# Patient Record
Sex: FEMALE | Race: WHITE | URBAN - METROPOLITAN AREA
[De-identification: names, ages, dates, MRNs, and addresses within clinical notes are randomized per-mention and may not be internally consistent; named-entity substitution may affect disease eponyms.]

---

## 2023-01-01 ENCOUNTER — APPOINTMENT (OUTPATIENT)
Dept: GENERAL RADIOLOGY | Age: 73
DRG: 871 | End: 2023-01-01
Payer: MEDICARE

## 2023-01-01 ENCOUNTER — APPOINTMENT (OUTPATIENT)
Dept: CT IMAGING | Age: 73
DRG: 871 | End: 2023-01-01
Payer: MEDICARE

## 2023-01-01 ENCOUNTER — HOSPITAL ENCOUNTER (INPATIENT)
Age: 73
LOS: 1 days | Discharge: HOSPICE | DRG: 871 | End: 2023-02-20
Attending: EMERGENCY MEDICINE | Admitting: INTERNAL MEDICINE
Payer: MEDICARE

## 2023-01-01 ENCOUNTER — APPOINTMENT (OUTPATIENT)
Dept: ULTRASOUND IMAGING | Age: 73
DRG: 871 | End: 2023-01-01
Payer: MEDICARE

## 2023-01-01 VITALS
HEART RATE: 90 BPM | OXYGEN SATURATION: 100 % | TEMPERATURE: 98.4 F | RESPIRATION RATE: 19 BRPM | BODY MASS INDEX: 35.89 KG/M2 | WEIGHT: 215.39 LBS | SYSTOLIC BLOOD PRESSURE: 119 MMHG | DIASTOLIC BLOOD PRESSURE: 55 MMHG | HEIGHT: 65 IN

## 2023-01-01 DIAGNOSIS — R77.8 ELEVATED TROPONIN: ICD-10-CM

## 2023-01-01 DIAGNOSIS — R74.8 ELEVATED LIVER ENZYMES: ICD-10-CM

## 2023-01-01 DIAGNOSIS — S22.49XA CLOSED FRACTURE OF MULTIPLE RIBS, UNSPECIFIED LATERALITY, INITIAL ENCOUNTER: ICD-10-CM

## 2023-01-01 DIAGNOSIS — R91.8 LUNG MASS: ICD-10-CM

## 2023-01-01 DIAGNOSIS — D64.9 ANEMIA, UNSPECIFIED TYPE: ICD-10-CM

## 2023-01-01 DIAGNOSIS — J90 LOCULATED PLEURAL EFFUSION: ICD-10-CM

## 2023-01-01 DIAGNOSIS — I46.9 CARDIOPULMONARY ARREST (HCC): Primary | ICD-10-CM

## 2023-01-01 DIAGNOSIS — Z93.0 TRACHEOSTOMY DEPENDENCE (HCC): ICD-10-CM

## 2023-01-01 DIAGNOSIS — R79.89 ELEVATED BRAIN NATRIURETIC PEPTIDE (BNP) LEVEL: ICD-10-CM

## 2023-01-01 LAB
ADENOVIRUS BY PCR: NOT DETECTED
ALBUMIN SERPL-MCNC: 2.5 G/DL (ref 3.5–4.6)
ALBUMIN SERPL-MCNC: 2.6 G/DL (ref 3.5–4.6)
ALP BLD-CCNC: 202 U/L (ref 40–130)
ALP BLD-CCNC: 243 U/L (ref 40–130)
ALT SERPL-CCNC: 40 U/L (ref 0–33)
ALT SERPL-CCNC: 64 U/L (ref 0–33)
ANION GAP SERPL CALCULATED.3IONS-SCNC: 10 MEQ/L (ref 9–15)
ANION GAP SERPL CALCULATED.3IONS-SCNC: 10 MEQ/L (ref 9–15)
ANION GAP SERPL CALCULATED.3IONS-SCNC: 15 MEQ/L (ref 9–15)
ANION GAP SERPL CALCULATED.3IONS-SCNC: 9 MEQ/L (ref 9–15)
ANION GAP SERPL CALCULATED.3IONS-SCNC: 9 MEQ/L (ref 9–15)
ANISOCYTOSIS: ABNORMAL
APTT: 38.1 SEC (ref 24.4–36.8)
APTT: 47.1 SEC (ref 24.4–36.8)
AST SERPL-CCNC: 27 U/L (ref 0–35)
AST SERPL-CCNC: 95 U/L (ref 0–35)
BACTERIA: ABNORMAL /HPF
BANDED NEUTROPHILS RELATIVE PERCENT: 9 % (ref 5–11)
BASE EXCESS ARTERIAL: 11 (ref -3–3)
BASE EXCESS ARTERIAL: 7 (ref -3–3)
BASOPHILS ABSOLUTE: 0 K/UL (ref 0–0.2)
BASOPHILS ABSOLUTE: 0.1 K/UL (ref 0–0.2)
BASOPHILS RELATIVE PERCENT: 0.4 %
BASOPHILS RELATIVE PERCENT: 1 %
BILIRUB SERPL-MCNC: 0.3 MG/DL (ref 0.2–0.7)
BILIRUB SERPL-MCNC: <0.2 MG/DL (ref 0.2–0.7)
BILIRUBIN DIRECT: <0.2 MG/DL (ref 0–0.4)
BILIRUBIN URINE: NEGATIVE
BILIRUBIN, INDIRECT: ABNORMAL MG/DL (ref 0–0.6)
BLOOD, URINE: ABNORMAL
BORDETELLA PARAPERTUSSIS BY PCR: NOT DETECTED
BORDETELLA PERTUSSIS BY PCR: NOT DETECTED
BUN BLDV-MCNC: 26 MG/DL (ref 8–23)
BUN BLDV-MCNC: 27 MG/DL (ref 8–23)
BUN BLDV-MCNC: 29 MG/DL (ref 8–23)
BUN BLDV-MCNC: 30 MG/DL (ref 8–23)
BUN BLDV-MCNC: 32 MG/DL (ref 8–23)
CALCIUM IONIZED: 1.21 MMOL/L (ref 1.12–1.32)
CALCIUM IONIZED: 1.28 MMOL/L (ref 1.12–1.32)
CALCIUM SERPL-MCNC: 8.6 MG/DL (ref 8.5–9.9)
CALCIUM SERPL-MCNC: 8.7 MG/DL (ref 8.5–9.9)
CALCIUM SERPL-MCNC: 8.8 MG/DL (ref 8.5–9.9)
CALCIUM SERPL-MCNC: 8.9 MG/DL (ref 8.5–9.9)
CALCIUM SERPL-MCNC: 8.9 MG/DL (ref 8.5–9.9)
CHLAMYDOPHILIA PNEUMONIAE BY PCR: NOT DETECTED
CHLORIDE BLD-SCNC: 100 MEQ/L (ref 95–107)
CHLORIDE BLD-SCNC: 101 MEQ/L (ref 95–107)
CHLORIDE BLD-SCNC: 102 MEQ/L (ref 95–107)
CHLORIDE BLD-SCNC: 102 MEQ/L (ref 95–107)
CHLORIDE BLD-SCNC: 98 MEQ/L (ref 95–107)
CHP ED QC CHECK: NORMAL
CLARITY: CLEAR
CO2: 29 MEQ/L (ref 20–31)
CO2: 30 MEQ/L (ref 20–31)
CO2: 32 MEQ/L (ref 20–31)
CO2: 32 MEQ/L (ref 20–31)
CO2: 33 MEQ/L (ref 20–31)
COLOR: YELLOW
CORONAVIRUS 229E BY PCR: NOT DETECTED
CORONAVIRUS HKU1 BY PCR: NOT DETECTED
CORONAVIRUS NL63 BY PCR: NOT DETECTED
CORONAVIRUS OC43 BY PCR: NOT DETECTED
CREAT SERPL-MCNC: 0.54 MG/DL (ref 0.5–0.9)
CREAT SERPL-MCNC: 0.55 MG/DL (ref 0.5–0.9)
CREAT SERPL-MCNC: 0.59 MG/DL (ref 0.5–0.9)
CREAT SERPL-MCNC: 0.65 MG/DL (ref 0.5–0.9)
CREAT SERPL-MCNC: 0.81 MG/DL (ref 0.5–0.9)
EKG ATRIAL RATE: 103 BPM
EKG P AXIS: 63 DEGREES
EKG P-R INTERVAL: 184 MS
EKG Q-T INTERVAL: 364 MS
EKG QRS DURATION: 102 MS
EKG QTC CALCULATION (BAZETT): 476 MS
EKG R AXIS: -57 DEGREES
EKG T AXIS: 70 DEGREES
EKG VENTRICULAR RATE: 103 BPM
EOSINOPHILS ABSOLUTE: 0.6 K/UL (ref 0–0.7)
EOSINOPHILS ABSOLUTE: 1.8 K/UL (ref 0–0.7)
EOSINOPHILS RELATIVE PERCENT: 2.8 %
EOSINOPHILS RELATIVE PERCENT: 5 %
EPITHELIAL CELLS, UA: ABNORMAL /HPF (ref 0–5)
GFR SERPL CREATININE-BSD FRML MDRD: >60 ML/MIN/{1.73_M2}
GLOBULIN: 2.8 G/DL (ref 2.3–3.5)
GLUCOSE BLD-MCNC: 123 MG/DL (ref 70–99)
GLUCOSE BLD-MCNC: 126 MG/DL (ref 70–99)
GLUCOSE BLD-MCNC: 132 MG/DL (ref 70–99)
GLUCOSE BLD-MCNC: 139 MG/DL (ref 70–99)
GLUCOSE BLD-MCNC: 142 MG/DL (ref 70–99)
GLUCOSE BLD-MCNC: 143 MG/DL (ref 70–99)
GLUCOSE BLD-MCNC: 145 MG/DL (ref 70–99)
GLUCOSE BLD-MCNC: 150 MG/DL (ref 70–99)
GLUCOSE BLD-MCNC: 173 MG/DL (ref 70–99)
GLUCOSE BLD-MCNC: 212 MG/DL (ref 70–99)
GLUCOSE BLD-MCNC: 214 MG/DL (ref 70–99)
GLUCOSE BLD-MCNC: 243 MG/DL (ref 70–99)
GLUCOSE BLD-MCNC: 261 MG/DL
GLUCOSE URINE: NEGATIVE MG/DL
HBA1C MFR BLD: 6.2 % (ref 4.8–5.9)
HCO3 ARTERIAL: 32.3 MMOL/L (ref 21–29)
HCO3 ARTERIAL: 36.3 MMOL/L (ref 21–29)
HCT VFR BLD CALC: 25 % (ref 37–47)
HCT VFR BLD CALC: 27.4 % (ref 37–47)
HEMOGLOBIN: 12.2 GM/DL (ref 12–16)
HEMOGLOBIN: 7.2 GM/DL (ref 12–16)
HEMOGLOBIN: 7.6 G/DL (ref 12–16)
HEMOGLOBIN: 7.9 G/DL (ref 12–16)
HUMAN METAPNEUMOVIRUS BY PCR: NOT DETECTED
HUMAN RHINOVIRUS/ENTEROVIRUS BY PCR: NOT DETECTED
HYALINE CASTS: ABNORMAL /HPF (ref 0–5)
INFLUENZA A BY PCR: NOT DETECTED
INFLUENZA B BY PCR: NOT DETECTED
INR BLD: 1.2
INR BLD: 1.3
IRON SATURATION: 25 % (ref 20–55)
IRON: 52 UG/DL (ref 37–145)
KETONES, URINE: NEGATIVE MG/DL
LACTATE: 0.47 MMOL/L (ref 0.4–2)
LACTATE: 4.34 MMOL/L (ref 0.4–2)
LACTIC ACID: 0.8 MMOL/L (ref 0.5–2.2)
LEUKOCYTE ESTERASE, URINE: ABNORMAL
LV EF: 55 %
LVEF MODALITY: NORMAL
LYMPHOCYTES ABSOLUTE: 1.3 K/UL (ref 1–4.8)
LYMPHOCYTES ABSOLUTE: 1.5 K/UL (ref 1–4.8)
LYMPHOCYTES RELATIVE PERCENT: 4 %
LYMPHOCYTES RELATIVE PERCENT: 5.8 %
MAGNESIUM: 1.6 MG/DL (ref 1.7–2.4)
MAGNESIUM: 1.6 MG/DL (ref 1.7–2.4)
MAGNESIUM: 1.7 MG/DL (ref 1.7–2.4)
MAGNESIUM: 1.8 MG/DL (ref 1.7–2.4)
MAGNESIUM: 1.8 MG/DL (ref 1.7–2.4)
MCH RBC QN AUTO: 28 PG (ref 27–31.3)
MCH RBC QN AUTO: 29.1 PG (ref 27–31.3)
MCHC RBC AUTO-ENTMCNC: 28.8 % (ref 33–37)
MCHC RBC AUTO-ENTMCNC: 30.5 % (ref 33–37)
MCV RBC AUTO: 95.2 FL (ref 79.4–94.8)
MCV RBC AUTO: 97 FL (ref 79.4–94.8)
METAMYELOCYTES RELATIVE PERCENT: 5 %
MONOCYTES ABSOLUTE: 1.5 K/UL (ref 0.2–0.8)
MONOCYTES ABSOLUTE: 2.5 K/UL (ref 0.2–0.8)
MONOCYTES RELATIVE PERCENT: 6.7 %
MONOCYTES RELATIVE PERCENT: 7 %
MUCUS: PRESENT /LPF
MYCOPLASMA PNEUMONIAE BY PCR: NOT DETECTED
NEUTROPHILS ABSOLUTE: 18.2 K/UL (ref 1.4–6.5)
NEUTROPHILS ABSOLUTE: 30.9 K/UL (ref 1.4–6.5)
NEUTROPHILS RELATIVE PERCENT: 71 %
NEUTROPHILS RELATIVE PERCENT: 84 %
NITRITE, URINE: NEGATIVE
O2 SAT, ARTERIAL: 96 % (ref 93–100)
O2 SAT, ARTERIAL: 96 % (ref 93–100)
PARAINFLUENZA VIRUS 1 BY PCR: NOT DETECTED
PARAINFLUENZA VIRUS 2 BY PCR: NOT DETECTED
PARAINFLUENZA VIRUS 3 BY PCR: NOT DETECTED
PARAINFLUENZA VIRUS 4 BY PCR: NOT DETECTED
PCO2 ARTERIAL: 53 MM HG (ref 35–45)
PCO2 ARTERIAL: 59 MM HG (ref 35–45)
PDW BLD-RTO: 18.6 % (ref 11.5–14.5)
PDW BLD-RTO: 19.4 % (ref 11.5–14.5)
PERFORMED ON: ABNORMAL
PH ARTERIAL: 7.39 (ref 7.35–7.45)
PH ARTERIAL: 7.4 (ref 7.35–7.45)
PH UA: 6.5 (ref 5–9)
PHOSPHORUS: 3.1 MG/DL (ref 2.3–4.8)
PHOSPHORUS: 3.5 MG/DL (ref 2.3–4.8)
PHOSPHORUS: 4 MG/DL (ref 2.3–4.8)
PHOSPHORUS: 4.5 MG/DL (ref 2.3–4.8)
PLATELET # BLD: 314 K/UL (ref 130–400)
PLATELET # BLD: 371 K/UL (ref 130–400)
PLATELET SLIDE REVIEW: ABNORMAL
PO2 ARTERIAL: 84 MM HG (ref 75–108)
PO2 ARTERIAL: 86 MM HG (ref 75–108)
POC CHLORIDE: 100 MEQ/L (ref 99–110)
POC CHLORIDE: 101 MEQ/L (ref 99–110)
POC CREATININE: 0.5 MG/DL (ref 0.6–1.2)
POC CREATININE: 0.9 MG/DL (ref 0.6–1.2)
POC FIO2: 100
POC FIO2: 50
POC HEMATOCRIT: 21 % (ref 36–48)
POC HEMATOCRIT: 36 % (ref 36–48)
POC POTASSIUM: 4.1 MEQ/L (ref 3.5–5.1)
POC POTASSIUM: 4.5 MEQ/L (ref 3.5–5.1)
POC SAMPLE TYPE: ABNORMAL
POC SAMPLE TYPE: ABNORMAL
POC SODIUM: 139 MEQ/L (ref 136–145)
POC SODIUM: 142 MEQ/L (ref 136–145)
POIKILOCYTES: ABNORMAL
POLYCHROMASIA: ABNORMAL
POTASSIUM SERPL-SCNC: 4.2 MEQ/L (ref 3.4–4.9)
POTASSIUM SERPL-SCNC: 4.2 MEQ/L (ref 3.4–4.9)
POTASSIUM SERPL-SCNC: 4.3 MEQ/L (ref 3.4–4.9)
POTASSIUM SERPL-SCNC: 4.8 MEQ/L (ref 3.4–4.9)
POTASSIUM SERPL-SCNC: 5.1 MEQ/L (ref 3.4–4.9)
PRO-BNP: 7629 PG/ML
PROTEIN UA: >=300 MG/DL
PROTHROMBIN TIME: 15.4 SEC (ref 12.3–14.9)
PROTHROMBIN TIME: 16 SEC (ref 12.3–14.9)
RBC # BLD: 2.63 M/UL (ref 4.2–5.4)
RBC # BLD: 2.83 M/UL (ref 4.2–5.4)
RBC UA: ABNORMAL /HPF (ref 0–2)
RESPIRATORY SYNCYTIAL VIRUS BY PCR: NOT DETECTED
SARS-COV-2, PCR: NOT DETECTED
SODIUM BLD-SCNC: 139 MEQ/L (ref 135–144)
SODIUM BLD-SCNC: 142 MEQ/L (ref 135–144)
SODIUM BLD-SCNC: 143 MEQ/L (ref 135–144)
SODIUM BLD-SCNC: 143 MEQ/L (ref 135–144)
SODIUM BLD-SCNC: 145 MEQ/L (ref 135–144)
SPECIFIC GRAVITY UA: 1.02 (ref 1–1.03)
SPECIMEN STATUS: NORMAL
STREP PNEUMO AG, UR: NEGATIVE
T4 FREE: 0.71 NG/DL (ref 0.84–1.68)
TCO2 ARTERIAL: 34 MMOL/L (ref 21–32)
TCO2 ARTERIAL: 38 MMOL/L (ref 21–32)
TEAR DROP CELLS: ABNORMAL
TOTAL IRON BINDING CAPACITY: 210 UG/DL (ref 250–450)
TOTAL PROTEIN: 5.2 G/DL (ref 6.3–8)
TOTAL PROTEIN: 5.3 G/DL (ref 6.3–8)
TROPONIN: 0.05 NG/ML (ref 0–0.01)
TROPONIN: 0.06 NG/ML (ref 0–0.01)
TROPONIN: 0.08 NG/ML (ref 0–0.01)
TROPONIN: 0.1 NG/ML (ref 0–0.01)
TSH REFLEX: 33.49 UIU/ML (ref 0.44–3.86)
TSH SERPL DL<=0.05 MIU/L-ACNC: 21.26 UIU/ML (ref 0.44–3.86)
UNSATURATED IRON BINDING CAPACITY: 158 UG/DL (ref 112–347)
URINE CULTURE, ROUTINE: NORMAL
URINE REFLEX TO CULTURE: YES
UROBILINOGEN, URINE: 0.2 E.U./DL
VALPROIC ACID LEVEL: 5.9 UG/ML (ref 50–100)
WBC # BLD: 21.7 K/UL (ref 4.8–10.8)
WBC # BLD: 36.4 K/UL (ref 4.8–10.8)
WBC UA: ABNORMAL /HPF (ref 0–5)
YEAST: PRESENT /HPF

## 2023-01-01 PROCEDURE — 84295 ASSAY OF SERUM SODIUM: CPT

## 2023-01-01 PROCEDURE — 96368 THER/DIAG CONCURRENT INF: CPT

## 2023-01-01 PROCEDURE — 6360000002 HC RX W HCPCS: Performed by: INTERNAL MEDICINE

## 2023-01-01 PROCEDURE — 87899 AGENT NOS ASSAY W/OPTIC: CPT

## 2023-01-01 PROCEDURE — 82435 ASSAY OF BLOOD CHLORIDE: CPT

## 2023-01-01 PROCEDURE — 80164 ASSAY DIPROPYLACETIC ACD TOT: CPT

## 2023-01-01 PROCEDURE — 82803 BLOOD GASES ANY COMBINATION: CPT

## 2023-01-01 PROCEDURE — 83605 ASSAY OF LACTIC ACID: CPT

## 2023-01-01 PROCEDURE — 87449 NOS EACH ORGANISM AG IA: CPT

## 2023-01-01 PROCEDURE — 87070 CULTURE OTHR SPECIMN AEROBIC: CPT

## 2023-01-01 PROCEDURE — 2000000000 HC ICU R&B

## 2023-01-01 PROCEDURE — 94002 VENT MGMT INPAT INIT DAY: CPT

## 2023-01-01 PROCEDURE — 82330 ASSAY OF CALCIUM: CPT

## 2023-01-01 PROCEDURE — 36600 WITHDRAWAL OF ARTERIAL BLOOD: CPT

## 2023-01-01 PROCEDURE — 83550 IRON BINDING TEST: CPT

## 2023-01-01 PROCEDURE — 2580000003 HC RX 258: Performed by: INTERNAL MEDICINE

## 2023-01-01 PROCEDURE — 2580000003 HC RX 258: Performed by: EMERGENCY MEDICINE

## 2023-01-01 PROCEDURE — 87184 SC STD DISK METHOD PER PLATE: CPT

## 2023-01-01 PROCEDURE — 82607 VITAMIN B-12: CPT

## 2023-01-01 PROCEDURE — 83036 HEMOGLOBIN GLYCOSYLATED A1C: CPT

## 2023-01-01 PROCEDURE — 89220 SPUTUM SPECIMEN COLLECTION: CPT

## 2023-01-01 PROCEDURE — 74177 CT ABD & PELVIS W/CONTRAST: CPT

## 2023-01-01 PROCEDURE — 71275 CT ANGIOGRAPHY CHEST: CPT

## 2023-01-01 PROCEDURE — 2700000000 HC OXYGEN THERAPY PER DAY

## 2023-01-01 PROCEDURE — 6360000004 HC RX CONTRAST MEDICATION: Performed by: EMERGENCY MEDICINE

## 2023-01-01 PROCEDURE — 99223 1ST HOSP IP/OBS HIGH 75: CPT | Performed by: INTERNAL MEDICINE

## 2023-01-01 PROCEDURE — 6370000000 HC RX 637 (ALT 250 FOR IP): Performed by: INTERNAL MEDICINE

## 2023-01-01 PROCEDURE — 84443 ASSAY THYROID STIM HORMONE: CPT

## 2023-01-01 PROCEDURE — 99291 CRITICAL CARE FIRST HOUR: CPT | Performed by: INTERNAL MEDICINE

## 2023-01-01 PROCEDURE — 82565 ASSAY OF CREATININE: CPT

## 2023-01-01 PROCEDURE — 6370000000 HC RX 637 (ALT 250 FOR IP): Performed by: EMERGENCY MEDICINE

## 2023-01-01 PROCEDURE — 84132 ASSAY OF SERUM POTASSIUM: CPT

## 2023-01-01 PROCEDURE — 85610 PROTHROMBIN TIME: CPT

## 2023-01-01 PROCEDURE — 85014 HEMATOCRIT: CPT

## 2023-01-01 PROCEDURE — 2500000003 HC RX 250 WO HCPCS: Performed by: EMERGENCY MEDICINE

## 2023-01-01 PROCEDURE — 93005 ELECTROCARDIOGRAM TRACING: CPT | Performed by: EMERGENCY MEDICINE

## 2023-01-01 PROCEDURE — 94003 VENT MGMT INPAT SUBQ DAY: CPT

## 2023-01-01 PROCEDURE — 2580000003 HC RX 258: Performed by: STUDENT IN AN ORGANIZED HEALTH CARE EDUCATION/TRAINING PROGRAM

## 2023-01-01 PROCEDURE — 93010 ELECTROCARDIOGRAM REPORT: CPT | Performed by: INTERNAL MEDICINE

## 2023-01-01 PROCEDURE — 87086 URINE CULTURE/COLONY COUNT: CPT

## 2023-01-01 PROCEDURE — 96365 THER/PROPH/DIAG IV INF INIT: CPT

## 2023-01-01 PROCEDURE — 93306 TTE W/DOPPLER COMPLETE: CPT

## 2023-01-01 PROCEDURE — 87040 BLOOD CULTURE FOR BACTERIA: CPT

## 2023-01-01 PROCEDURE — 87077 CULTURE AEROBIC IDENTIFY: CPT

## 2023-01-01 PROCEDURE — 99231 SBSQ HOSP IP/OBS SF/LOW 25: CPT | Performed by: THORACIC SURGERY (CARDIOTHORACIC VASCULAR SURGERY)

## 2023-01-01 PROCEDURE — 80048 BASIC METABOLIC PNL TOTAL CA: CPT

## 2023-01-01 PROCEDURE — 85730 THROMBOPLASTIN TIME PARTIAL: CPT

## 2023-01-01 PROCEDURE — 84100 ASSAY OF PHOSPHORUS: CPT

## 2023-01-01 PROCEDURE — 2580000003 HC RX 258

## 2023-01-01 PROCEDURE — 6360000002 HC RX W HCPCS: Performed by: EMERGENCY MEDICINE

## 2023-01-01 PROCEDURE — 76705 ECHO EXAM OF ABDOMEN: CPT

## 2023-01-01 PROCEDURE — 80053 COMPREHEN METABOLIC PANEL: CPT

## 2023-01-01 PROCEDURE — A4216 STERILE WATER/SALINE, 10 ML: HCPCS | Performed by: INTERNAL MEDICINE

## 2023-01-01 PROCEDURE — 83880 ASSAY OF NATRIURETIC PEPTIDE: CPT

## 2023-01-01 PROCEDURE — 2500000003 HC RX 250 WO HCPCS: Performed by: INTERNAL MEDICINE

## 2023-01-01 PROCEDURE — 80076 HEPATIC FUNCTION PANEL: CPT

## 2023-01-01 PROCEDURE — 99285 EMERGENCY DEPT VISIT HI MDM: CPT

## 2023-01-01 PROCEDURE — 82746 ASSAY OF FOLIC ACID SERUM: CPT

## 2023-01-01 PROCEDURE — 99232 SBSQ HOSP IP/OBS MODERATE 35: CPT | Performed by: INTERNAL MEDICINE

## 2023-01-01 PROCEDURE — 83735 ASSAY OF MAGNESIUM: CPT

## 2023-01-01 PROCEDURE — 36415 COLL VENOUS BLD VENIPUNCTURE: CPT

## 2023-01-01 PROCEDURE — 0202U NFCT DS 22 TRGT SARS-COV-2: CPT

## 2023-01-01 PROCEDURE — 81001 URINALYSIS AUTO W/SCOPE: CPT

## 2023-01-01 PROCEDURE — 85025 COMPLETE CBC W/AUTO DIFF WBC: CPT

## 2023-01-01 PROCEDURE — 84439 ASSAY OF FREE THYROXINE: CPT

## 2023-01-01 PROCEDURE — 84484 ASSAY OF TROPONIN QUANT: CPT

## 2023-01-01 PROCEDURE — 71045 X-RAY EXAM CHEST 1 VIEW: CPT

## 2023-01-01 PROCEDURE — 83540 ASSAY OF IRON: CPT

## 2023-01-01 PROCEDURE — 70450 CT HEAD/BRAIN W/O DYE: CPT

## 2023-01-01 PROCEDURE — 96366 THER/PROPH/DIAG IV INF ADDON: CPT

## 2023-01-01 RX ORDER — PROPOFOL 10 MG/ML
5-50 INJECTION, EMULSION INTRAVENOUS CONTINUOUS
OUTPATIENT
Start: 2023-01-01

## 2023-01-01 RX ORDER — 0.9 % SODIUM CHLORIDE 0.9 %
1000 INTRAVENOUS SOLUTION INTRAVENOUS ONCE
Status: COMPLETED | OUTPATIENT
Start: 2023-01-01 | End: 2023-01-01

## 2023-01-01 RX ORDER — ONDANSETRON 2 MG/ML
4 INJECTION INTRAMUSCULAR; INTRAVENOUS EVERY 6 HOURS PRN
Status: DISCONTINUED | OUTPATIENT
Start: 2023-01-01 | End: 2023-01-01 | Stop reason: ALTCHOICE

## 2023-01-01 RX ORDER — MAGNESIUM SULFATE IN WATER 40 MG/ML
2000 INJECTION, SOLUTION INTRAVENOUS PRN
Status: DISCONTINUED | OUTPATIENT
Start: 2023-01-01 | End: 2023-01-01 | Stop reason: HOSPADM

## 2023-01-01 RX ORDER — INSULIN GLARGINE 100 [IU]/ML
24 INJECTION, SOLUTION SUBCUTANEOUS NIGHTLY
COMMUNITY

## 2023-01-01 RX ORDER — FUROSEMIDE 10 MG/ML
40 INJECTION INTRAMUSCULAR; INTRAVENOUS DAILY
COMMUNITY

## 2023-01-01 RX ORDER — ONDANSETRON 4 MG/1
4 TABLET, ORALLY DISINTEGRATING ORAL EVERY 8 HOURS PRN
Status: DISCONTINUED | OUTPATIENT
Start: 2023-01-01 | End: 2023-01-01 | Stop reason: ALTCHOICE

## 2023-01-01 RX ORDER — INSULIN GLARGINE 100 [IU]/ML
24 INJECTION, SOLUTION SUBCUTANEOUS NIGHTLY
Status: DISCONTINUED | OUTPATIENT
Start: 2023-01-01 | End: 2023-01-01 | Stop reason: HOSPADM

## 2023-01-01 RX ORDER — MEROPENEM 1 G/1
1000 INJECTION, POWDER, FOR SOLUTION INTRAVENOUS EVERY 8 HOURS
COMMUNITY

## 2023-01-01 RX ORDER — ONDANSETRON 2 MG/ML
4 INJECTION INTRAMUSCULAR; INTRAVENOUS EVERY 6 HOURS PRN
OUTPATIENT
Start: 2023-01-01

## 2023-01-01 RX ORDER — ASCORBIC ACID 500 MG
500 TABLET ORAL DAILY
COMMUNITY

## 2023-01-01 RX ORDER — ONDANSETRON 4 MG/1
4 TABLET, ORALLY DISINTEGRATING ORAL EVERY 8 HOURS PRN
OUTPATIENT
Start: 2023-01-01

## 2023-01-01 RX ORDER — NOREPINEPHRINE BIT/0.9 % NACL 16MG/250ML
1-50 INFUSION BOTTLE (ML) INTRAVENOUS CONTINUOUS
Status: DISCONTINUED | OUTPATIENT
Start: 2023-01-01 | End: 2023-01-01 | Stop reason: HOSPADM

## 2023-01-01 RX ORDER — LANOLIN ALCOHOL/MO/W.PET/CERES
10 CREAM (GRAM) TOPICAL NIGHTLY
COMMUNITY

## 2023-01-01 RX ORDER — ATORVASTATIN CALCIUM 80 MG/1
80 TABLET, FILM COATED ORAL NIGHTLY
COMMUNITY

## 2023-01-01 RX ORDER — ACETYLCYSTEINE 200 MG/ML
70 SOLUTION ORAL; RESPIRATORY (INHALATION) 2 TIMES DAILY
COMMUNITY

## 2023-01-01 RX ORDER — ASPIRIN 81 MG/1
81 TABLET ORAL DAILY
Status: DISCONTINUED | OUTPATIENT
Start: 2023-01-01 | End: 2023-01-01 | Stop reason: HOSPADM

## 2023-01-01 RX ORDER — CHLORHEXIDINE GLUCONATE 0.12 MG/ML
15 RINSE ORAL 2 TIMES DAILY
Status: DISCONTINUED | OUTPATIENT
Start: 2023-01-01 | End: 2023-01-01 | Stop reason: HOSPADM

## 2023-01-01 RX ORDER — DEXTROSE MONOHYDRATE 100 MG/ML
INJECTION, SOLUTION INTRAVENOUS CONTINUOUS PRN
OUTPATIENT
Start: 2023-01-01

## 2023-01-01 RX ORDER — MAGNESIUM SULFATE IN WATER 40 MG/ML
2000 INJECTION, SOLUTION INTRAVENOUS PRN
OUTPATIENT
Start: 2023-01-01

## 2023-01-01 RX ORDER — ENOXAPARIN SODIUM 100 MG/ML
1 INJECTION SUBCUTANEOUS 2 TIMES DAILY
Status: DISCONTINUED | OUTPATIENT
Start: 2023-01-01 | End: 2023-01-01 | Stop reason: HOSPADM

## 2023-01-01 RX ORDER — ASPIRIN 300 MG/1
300 SUPPOSITORY RECTAL ONCE
Status: COMPLETED | OUTPATIENT
Start: 2023-01-01 | End: 2023-01-01

## 2023-01-01 RX ORDER — HEPARIN SODIUM 10000 [USP'U]/ML
10000 INJECTION, SOLUTION INTRAVENOUS; SUBCUTANEOUS EVERY 8 HOURS
COMMUNITY

## 2023-01-01 RX ORDER — SODIUM CHLORIDE 9 MG/ML
INJECTION, SOLUTION INTRAVENOUS
Status: COMPLETED
Start: 2023-01-01 | End: 2023-01-01

## 2023-01-01 RX ORDER — LEVOTHYROXINE SODIUM 20 UG/ML
100 INJECTION, SOLUTION INTRAVENOUS DAILY
Status: DISCONTINUED | OUTPATIENT
Start: 2023-01-01 | End: 2023-01-01 | Stop reason: HOSPADM

## 2023-01-01 RX ORDER — CHLORHEXIDINE GLUCONATE 0.12 MG/ML
15 RINSE ORAL 2 TIMES DAILY
OUTPATIENT
Start: 2023-01-01

## 2023-01-01 RX ORDER — ALBUTEROL SULFATE 0.63 MG/3ML
1 SOLUTION RESPIRATORY (INHALATION) EVERY 6 HOURS PRN
COMMUNITY

## 2023-01-01 RX ORDER — CHLORHEXIDINE GLUCONATE 0.12 MG/ML
15 RINSE ORAL 2 TIMES DAILY
Status: DISCONTINUED | OUTPATIENT
Start: 2023-01-01 | End: 2023-01-01

## 2023-01-01 RX ORDER — LEVOTHYROXINE SODIUM 0.1 MG/1
137.5 TABLET ORAL DAILY
COMMUNITY

## 2023-01-01 RX ORDER — ONDANSETRON 2 MG/ML
4 INJECTION INTRAMUSCULAR; INTRAVENOUS EVERY 6 HOURS PRN
Status: DISCONTINUED | OUTPATIENT
Start: 2023-01-01 | End: 2023-01-01 | Stop reason: HOSPADM

## 2023-01-01 RX ORDER — NYSTATIN 10B UNIT
POWDER (EA) MISCELLANEOUS 2 TIMES DAILY
COMMUNITY

## 2023-01-01 RX ORDER — DEXTROSE MONOHYDRATE 100 MG/ML
INJECTION, SOLUTION INTRAVENOUS CONTINUOUS PRN
Status: DISCONTINUED | OUTPATIENT
Start: 2023-01-01 | End: 2023-01-01 | Stop reason: HOSPADM

## 2023-01-01 RX ORDER — QUETIAPINE FUMARATE 25 MG/1
50 TABLET, FILM COATED ORAL DAILY
COMMUNITY

## 2023-01-01 RX ORDER — POTASSIUM CHLORIDE 750 MG/1
10 CAPSULE, EXTENDED RELEASE ORAL DAILY
COMMUNITY

## 2023-01-01 RX ORDER — INSULIN LISPRO 100 [IU]/ML
0-4 INJECTION, SOLUTION INTRAVENOUS; SUBCUTANEOUS
Status: DISCONTINUED | OUTPATIENT
Start: 2023-01-01 | End: 2023-01-01 | Stop reason: HOSPADM

## 2023-01-01 RX ORDER — INSULIN LISPRO 100 [IU]/ML
0-4 INJECTION, SOLUTION INTRAVENOUS; SUBCUTANEOUS NIGHTLY
Status: DISCONTINUED | OUTPATIENT
Start: 2023-01-01 | End: 2023-01-01 | Stop reason: HOSPADM

## 2023-01-01 RX ORDER — METOLAZONE 5 MG/1
5 TABLET ORAL DAILY
COMMUNITY

## 2023-01-01 RX ORDER — OMEPRAZOLE 20 MG/1
40 CAPSULE, DELAYED RELEASE ORAL DAILY
COMMUNITY

## 2023-01-01 RX ORDER — NOREPINEPHRINE BIT/0.9 % NACL 16MG/250ML
1-100 INFUSION BOTTLE (ML) INTRAVENOUS CONTINUOUS
Status: DISCONTINUED | OUTPATIENT
Start: 2023-01-01 | End: 2023-01-01 | Stop reason: ALTCHOICE

## 2023-01-01 RX ORDER — PROPOFOL 10 MG/ML
5-50 INJECTION, EMULSION INTRAVENOUS CONTINUOUS
Status: DISCONTINUED | OUTPATIENT
Start: 2023-01-01 | End: 2023-01-01 | Stop reason: HOSPADM

## 2023-01-01 RX ORDER — M-VIT,TX,IRON,MINS/CALC/FOLIC 27MG-0.4MG
1 TABLET ORAL DAILY
COMMUNITY

## 2023-01-01 RX ORDER — QUETIAPINE FUMARATE 100 MG/1
100 TABLET, FILM COATED ORAL NIGHTLY
COMMUNITY

## 2023-01-01 RX ORDER — ONDANSETRON 4 MG/1
4 TABLET, ORALLY DISINTEGRATING ORAL EVERY 8 HOURS PRN
Status: DISCONTINUED | OUTPATIENT
Start: 2023-01-01 | End: 2023-01-01 | Stop reason: HOSPADM

## 2023-01-01 RX ORDER — ACETAMINOPHEN 160 MG/5ML
15 SUSPENSION ORAL EVERY 6 HOURS PRN
COMMUNITY

## 2023-01-01 RX ADMIN — ASPIRIN 81 MG: 81 TABLET, COATED ORAL at 12:44

## 2023-01-01 RX ADMIN — VANCOMYCIN HYDROCHLORIDE 1000 MG: 1 INJECTION, POWDER, LYOPHILIZED, FOR SOLUTION INTRAVENOUS at 17:25

## 2023-01-01 RX ADMIN — SODIUM CHLORIDE, PRESERVATIVE FREE 20 MG: 5 INJECTION INTRAVENOUS at 10:45

## 2023-01-01 RX ADMIN — ENOXAPARIN SODIUM 100 MG: 100 INJECTION SUBCUTANEOUS at 20:11

## 2023-01-01 RX ADMIN — LEVOTHYROXINE SODIUM 100 MCG: 20 INJECTION, SOLUTION INTRAVENOUS at 10:59

## 2023-01-01 RX ADMIN — VANCOMYCIN HYDROCHLORIDE 1000 MG: 1 INJECTION, POWDER, LYOPHILIZED, FOR SOLUTION INTRAVENOUS at 15:58

## 2023-01-01 RX ADMIN — SODIUM CHLORIDE 1000 ML: 9 INJECTION, SOLUTION INTRAVENOUS at 03:48

## 2023-01-01 RX ADMIN — SODIUM CHLORIDE 1000 ML: 9 INJECTION, SOLUTION INTRAVENOUS at 05:18

## 2023-01-01 RX ADMIN — MEROPENEM 1000 MG: 1 INJECTION, POWDER, FOR SOLUTION INTRAVENOUS at 10:47

## 2023-01-01 RX ADMIN — SODIUM CHLORIDE 1000 ML: 9 INJECTION, SOLUTION INTRAVENOUS at 03:22

## 2023-01-01 RX ADMIN — MEROPENEM 1000 MG: 1 INJECTION, POWDER, FOR SOLUTION INTRAVENOUS at 16:46

## 2023-01-01 RX ADMIN — MEROPENEM 1000 MG: 1 INJECTION, POWDER, FOR SOLUTION INTRAVENOUS at 06:03

## 2023-01-01 RX ADMIN — SODIUM CHLORIDE, PRESERVATIVE FREE 20 MG: 5 INJECTION INTRAVENOUS at 20:11

## 2023-01-01 RX ADMIN — NOREPINEPHRINE BITARTRATE 5 MCG/MIN: 1 INJECTION INTRAVENOUS at 03:35

## 2023-01-01 RX ADMIN — ENOXAPARIN SODIUM 100 MG: 100 INJECTION SUBCUTANEOUS at 10:45

## 2023-01-01 RX ADMIN — CHLORHEXIDINE GLUCONATE 15 ML: 1.2 RINSE BUCCAL at 20:11

## 2023-01-01 RX ADMIN — VANCOMYCIN HYDROCHLORIDE 1000 MG: 1 INJECTION, POWDER, LYOPHILIZED, FOR SOLUTION INTRAVENOUS at 03:18

## 2023-01-01 RX ADMIN — CHLORHEXIDINE GLUCONATE 15 ML: 1.2 RINSE BUCCAL at 10:45

## 2023-01-01 RX ADMIN — Medication 8 MCG/MIN: at 07:16

## 2023-01-01 RX ADMIN — PROPOFOL 25 MCG/KG/MIN: 10 INJECTION, EMULSION INTRAVENOUS at 05:06

## 2023-01-01 RX ADMIN — PIPERACILLIN AND TAZOBACTAM 3375 MG: 3; .375 INJECTION, POWDER, FOR SOLUTION INTRAVENOUS at 05:03

## 2023-01-01 RX ADMIN — VANCOMYCIN HYDROCHLORIDE 1250 MG: 1.25 INJECTION, POWDER, LYOPHILIZED, FOR SOLUTION INTRAVENOUS at 05:05

## 2023-01-01 RX ADMIN — MEROPENEM 1000 MG: 1 INJECTION, POWDER, FOR SOLUTION INTRAVENOUS at 22:16

## 2023-01-01 RX ADMIN — PROPOFOL 25 MCG/KG/MIN: 10 INJECTION, EMULSION INTRAVENOUS at 16:51

## 2023-01-01 RX ADMIN — LEVOTHYROXINE SODIUM 100 MCG: 20 INJECTION, SOLUTION INTRAVENOUS at 11:51

## 2023-01-01 RX ADMIN — MAGNESIUM SULFATE HEPTAHYDRATE 2000 MG: 40 INJECTION, SOLUTION INTRAVENOUS at 00:52

## 2023-01-01 RX ADMIN — IOPAMIDOL 75 ML: 612 INJECTION, SOLUTION INTRAVENOUS at 04:40

## 2023-01-01 RX ADMIN — PROPOFOL 25 MCG/KG/MIN: 10 INJECTION, EMULSION INTRAVENOUS at 23:51

## 2023-01-01 RX ADMIN — MEROPENEM 1000 MG: 1 INJECTION, POWDER, FOR SOLUTION INTRAVENOUS at 15:58

## 2023-01-01 RX ADMIN — CHLORHEXIDINE GLUCONATE 15 ML: 1.2 RINSE BUCCAL at 11:39

## 2023-01-01 RX ADMIN — SODIUM CHLORIDE, PRESERVATIVE FREE 20 MG: 5 INJECTION INTRAVENOUS at 11:40

## 2023-01-01 RX ADMIN — PROPOFOL 25 MCG/KG/MIN: 10 INJECTION, EMULSION INTRAVENOUS at 11:43

## 2023-01-01 RX ADMIN — PROPOFOL 20 MCG/KG/MIN: 10 INJECTION, EMULSION INTRAVENOUS at 10:58

## 2023-01-01 RX ADMIN — ASPIRIN 300 MG: 300 SUPPOSITORY RECTAL at 05:22

## 2023-01-01 RX ADMIN — Medication 1000 ML: at 03:22

## 2023-01-01 RX ADMIN — ENOXAPARIN SODIUM 100 MG: 100 INJECTION SUBCUTANEOUS at 11:39

## 2023-01-01 ASSESSMENT — PAIN SCALES - GENERAL
PAINLEVEL_OUTOF10: 0

## 2023-01-01 ASSESSMENT — PULMONARY FUNCTION TESTS
PIF_VALUE: 30
PIF_VALUE: 42
PIF_VALUE: 34
PIF_VALUE: 31
PIF_VALUE: 30
PIF_VALUE: 31
PIF_VALUE: 29
PIF_VALUE: 31
PIF_VALUE: 31
PIF_VALUE: 30
PIF_VALUE: 29
PIF_VALUE: 19
PIF_VALUE: 32
PIF_VALUE: 30
PIF_VALUE: 22
PIF_VALUE: 32
PIF_VALUE: 27
PIF_VALUE: 21
PIF_VALUE: 34
PIF_VALUE: 27
PIF_VALUE: 32
PIF_VALUE: 27
PIF_VALUE: 38
PIF_VALUE: 28
PIF_VALUE: 32
PIF_VALUE: 44
PIF_VALUE: 41
PIF_VALUE: 41
PIF_VALUE: 31
PIF_VALUE: 32
PIF_VALUE: 24
PIF_VALUE: 18
PIF_VALUE: 31
PIF_VALUE: 28
PIF_VALUE: 30
PIF_VALUE: 36
PIF_VALUE: 35
PIF_VALUE: 26
PIF_VALUE: 27
PIF_VALUE: 22
PIF_VALUE: 23
PIF_VALUE: 26
PIF_VALUE: 30
PIF_VALUE: 30
PIF_VALUE: 34
PIF_VALUE: 36
PIF_VALUE: 27
PIF_VALUE: 28
PIF_VALUE: 27
PIF_VALUE: 24
PIF_VALUE: 30
PIF_VALUE: 35
PIF_VALUE: 34
PIF_VALUE: 33
PIF_VALUE: 16
PIF_VALUE: 29
PIF_VALUE: 31
PIF_VALUE: 35
PIF_VALUE: 27
PIF_VALUE: 32
PIF_VALUE: 32
PIF_VALUE: 29
PIF_VALUE: 19
PIF_VALUE: 42
PIF_VALUE: 34
PIF_VALUE: 24
PIF_VALUE: 30
PIF_VALUE: 25
PIF_VALUE: 37
PIF_VALUE: 31
PIF_VALUE: 32
PIF_VALUE: 29
PIF_VALUE: 26
PIF_VALUE: 19
PIF_VALUE: 27
PIF_VALUE: 27
PIF_VALUE: 34
PIF_VALUE: 32
PIF_VALUE: 32
PIF_VALUE: 20
PIF_VALUE: 33
PIF_VALUE: 29
PIF_VALUE: 21
PIF_VALUE: 32
PIF_VALUE: 29
PIF_VALUE: 33
PIF_VALUE: 30
PIF_VALUE: 32
PIF_VALUE: 29
PIF_VALUE: 28
PIF_VALUE: 29
PIF_VALUE: 29
PIF_VALUE: 31
PIF_VALUE: 27
PIF_VALUE: 32
PIF_VALUE: 27
PIF_VALUE: 32
PIF_VALUE: 35
PIF_VALUE: 30
PIF_VALUE: 32
PIF_VALUE: 32
PIF_VALUE: 18
PIF_VALUE: 32
PIF_VALUE: 28
PIF_VALUE: 38
PIF_VALUE: 35
PIF_VALUE: 25
PIF_VALUE: 35

## 2023-02-19 PROBLEM — I46.9 CARDIAC ARREST (HCC): Status: ACTIVE | Noted: 2023-02-19

## 2023-02-19 NOTE — ED PROVIDER NOTES
[address] [facility] ED  EMERGENCY DEPARTMENT ENCOUNTER      Pt Name: Brad Garcia  MRN: [id number]  [place] 1950  Date of evaluation: 2/19/2023  Provider: Matthew Castillo MD    CHIEF COMPLAINT       Chief Complaint   Patient presents with    Respiratory Arrest     Post resp arrest at [facility], arrested at [facility]   (Location/Symptom, Timing/Onset, Context/Setting, Quality, Duration, Modifying Factors, Severity)  Note limiting factors. Brad Garcia is a 81 y.o. female who presents to the emergency department via EMS status post respiratory arrest.  EMS and her facility state that she was recently at [facility] for surgery for necrotizing fasciitis of the abdominal wall. Patient presents with IV in the right hand, PICC line to left AC, rectal tube, Latham and JEFRY drain to left abdominal wall. She presents with paperwork stating that at baseline she is in restraints and does not follow commands. Patient is a full code. Chart review shows history of trach dependent, Lasix use, diabetes, hypothyroidism, valproic acid use, heparin use, currently on meropenem metabolic encephalopathy, left hemothorax status post VATS. Unable to get history from the patient at this time. Nurse report was called and stated that the patient seemed to have difficulty breathing, they attempted suctioning, she became cyanotic and coded, patient received epinephrine x2 and CPR prior to arrival.  At no time was the patient in a shockable rhythm. Presents the emergency department status post ROSC. HPI    Nursing Notes were reviewed. REVIEW OF SYSTEMS    (2-9 systems for level 4, 10 or more for level 5)     Review of Systems   Unable to perform ROS: Patient unresponsive     Except as noted above the remainder of the review of systems was reviewed and negative.        PAST MEDICAL HISTORY     Past Medical History:   Diagnosis Date    Bladder carcinoma ([facility] [address]     CAD (coronary artery disease) COPD (chronic obstructive pulmonary disease) (HCC)     Diabetes mellitus (HCC)     DVT (deep venous thrombosis) ([facility])     right upper ext    Simona gangrene     Hemothorax on left     Hypertension     Metabolic encephalopathy     Necrotizing fasciitis ([address]     Respiratory failure (HCC)          SURGICAL HISTORY       Past Surgical History:   Procedure Laterality Date    BILATERAL VATS ABLATION      TRACHEOSTOMY           CURRENT MEDICATIONS       Previous Medications    ACETAMINOPHEN (TYLENOL) 160 MG/5ML LIQUID    Take 15 mg/kg by mouth every 6 hours as needed for Fever    ACETYLCYSTEINE (MUCOMYST) 20 % NEBULIZER SOLUTION    Inhale 70 mg/kg into the lungs 2 times daily    ALBUTEROL (ACCUNEB) 0.63 MG/3ML NEBULIZER SOLUTION    Take 1 ampule by nebulization every 6 hours as needed for Wheezing    ASCORBIC ACID (VITAMIN C) 500 MG TABLET    Take 500 mg by mouth daily    ATORVASTATIN (LIPITOR) 80 MG TABLET    Take 80 mg by mouth at bedtime    COLLAGENASE 250 UNIT/GM OINTMENT    Apply topically daily Apply topically daily. FUROSEMIDE (LASIX) 10 MG/ML INJECTION    Infuse 40 mg intravenously daily    HEPARIN SODIUM, PORCINE, (HEPARIN, PORCINE,) 87757 UNIT/ML INJECTION    Inject 10,000 Units into the skin in the morning and 10,000 Units at noon and 10,000 Units in the evening. INSULIN GLARGINE (LANTUS) 100 UNIT/ML INJECTION VIAL    Inject 24 Units into the skin nightly    INSULIN LISPRO (HUMALOG) 100 UNIT/ML INJECTION VIAL    Inject 5 Units into the skin 3 times daily (before meals)    LACTULOSE (CEPHULAC) 20 G PACKET    20 g by Per NG tube route 2 times daily    LEVOTHYROXINE (SYNTHROID) 100 MCG TABLET    Take 137.5 mcg by mouth Daily    MELATONIN 3 MG TABS TABLET    Take 10 mg by mouth at bedtime    MEROPENEM (MERREM) 1 G INJECTION    Infuse 1,000 mg intravenously in the morning and 1,000 mg at noon and 1,000 mg in the evening.     METOLAZONE (ZAROXOLYN) 5 MG TABLET    Take 5 mg by mouth daily    METOPROLOL TARTRATE (LOPRESSOR) 25 MG TABLET    Take 12.5 mg by mouth 2 times daily    MULTIPLE VITAMINS-MINERALS (THERAPEUTIC MULTIVITAMIN-MINERALS) TABLET    1 tablet by PEG Tube route daily    NYSTATIN (MYCOSTATIN) POWD POWDER    Apply topically 2 times daily    OMEPRAZOLE (PRILOSEC) 20 MG DELAYED RELEASE CAPSULE    Take 40 mg by mouth daily    POTASSIUM CHLORIDE (MICRO-K) 10 MEQ EXTENDED RELEASE CAPSULE    Take 10 mEq by mouth daily    QUETIAPINE (SEROQUEL) 100 MG TABLET    Take 100 mg by mouth at bedtime    QUETIAPINE (SEROQUEL) 25 MG TABLET    Take 50 mg by mouth daily    SERTRALINE (ZOLOFT) 50 MG TABLET    Take 50 mg by mouth daily    SODIUM HYPOCHLORITE (DAKINS) 0.125 % SOLN    Irrigate with as directed daily    SODIUM ZIRCONIUM CYCLOSILICATE (LOKELMA) 5 G PACK ORAL SUSPENSION    5 g in the morning and at bedtime       ALLERGIES     Patient has no known allergies. FAMILY HISTORY     History reviewed. No pertinent family history. SOCIAL HISTORY       Social History     Socioeconomic History    Marital status:      Spouse name: None    Number of children: None    Years of education: None    Highest education level: None       SCREENINGS         [place] Coma Scale  Eye Opening: None  Best Verbal Response: None  Best Motor Response: None  [place] Coma Scale Score: 3                     CIWA Assessment  BP: (!) 83/49  Heart Rate: 80                 PHYSICAL EXAM    (up to 7 for level 4, 8 or more for level 5)     ED Triage Vitals [02/19/23 0308]   BP Temp Temp src Heart Rate Resp SpO2 Height Weight   105/60 -- -- (!) 107 20 -- -- --       Physical Exam  Vitals reviewed. Exam conducted with a chaperone present. Constitutional:       Appearance: She is obese. She is ill-appearing. Comments: Obese, chronically ill-appearing, GCS 3.   Does not follow commands, does not withdraw from pain, pupils equal and reactive, dry oral mucosa, trach collar in place, equal breath sounds, left abdominal wall postsurgical site with surrounding erythema but no dehiscence or purulent discharge   HENT:      Head: Normocephalic and atraumatic. Mouth/Throat:      Mouth: Mucous membranes are dry. Eyes:      General: No scleral icterus. Pupils: Pupils are equal, round, and reactive to light. Cardiovascular:      Rate and Rhythm: Regular rhythm. Tachycardia present. Pulses: Normal pulses. Pulmonary:      Effort: Pulmonary effort is normal.      Breath sounds: No stridor. Abdominal:      General: There is no distension. Tenderness: There is no abdominal tenderness. Musculoskeletal:      Cervical back: No rigidity. Right lower leg: No edema. Left lower leg: No edema. Skin:     Capillary Refill: Capillary refill takes less than 2 seconds. Neurological:      Mental Status: She is alert. Comments: Does not answer questions, does not follow commands   Left lateral and posterior lateral thoracic cage postsurgical site from previous VATS with no surrounding cellulitis    DIAGNOSTIC RESULTS     EKG: All EKG's are interpreted by the Emergency Department Physician who either signs or Co-signs this chart in the absence of a cardiologist.    Sinus tachycardia, rate 103, left axis deviation, QTc 476, nonspecific T wave change, no STEMI    RADIOLOGY:   Non-plain film images such as CT, Ultrasound and MRI are read by the radiologist. Plain radiographic images are visualized and preliminarily interpreted by the emergency physician with the below findings:      Interpretation per the Radiologist below, if available at the time of this note:    XR CHEST PORTABLE   Final Result   1. Moderate perihilar pulmonary edema and small bilateral pleural effusions. 2. Minimally displaced fracture left lateral 7th rib. No pneumothorax or   subcutaneous emphysema. 3. Tracheostomy, feeding tube, and left PICC appear with good position.       RECOMMENDATION:   Careful clinical correlation and follow up recommended. CTA CHEST W WO CONTRAST    (Results Pending)   CT ABDOMEN PELVIS W IV CONTRAST Additional Contrast? None    (Results Pending)   CT HEAD WO CONTRAST    (Results Pending)   US ABDOMEN LIMITED Specify organ?  GALLBLADDER    (Results Pending)         ED BEDSIDE ULTRASOUND:   Performed by ED Physician - none    LABS:  Labs Reviewed   CBC WITH AUTO DIFFERENTIAL - Abnormal; Notable for the following components:       Result Value    WBC 36.4 (*)     RBC 2.83 (*)     Hemoglobin 7.9 (*)     Hematocrit 27.4 (*)     MCV 97.0 (*)     MCHC 28.8 (*)     RDW 19.4 (*)     Neutrophils Absolute 30.9 (*)     Monocytes Absolute 2.5 (*)     Eosinophils Absolute 1.8 (*)     All other components within normal limits   COMPREHENSIVE METABOLIC PANEL - Abnormal; Notable for the following components:    Glucose 243 (*)     BUN 30 (*)     Total Protein 5.3 (*)     Albumin 2.5 (*)     Alkaline Phosphatase 243 (*)     ALT 64 (*)     AST 95 (*)     All other components within normal limits   PROTIME-INR - Abnormal; Notable for the following components:    Protime 16.0 (*)     All other components within normal limits   APTT - Abnormal; Notable for the following components:    aPTT 47.1 (*)     All other components within normal limits   TROPONIN - Abnormal; Notable for the following components:    Troponin 0.047 (*)     All other components within normal limits    Narrative:     Celina March tel. [phone],  TROP results called to and read back by DR BARR, 02/19/2023 04:34, by Roman Sim 1943 - Abnormal; Notable for the following components:    Blood, Urine SMALL (*)     Protein, UA >=300 (*)     Leukocyte Esterase, Urine TRACE (*)     All other components within normal limits   VALPROIC ACID LEVEL, TOTAL - Abnormal; Notable for the following components:    Valproic Acid Lvl 5.9 (*)     All other components within normal limits   TSH WITH REFLEX - Abnormal; Notable for the following components: TSH 33.490 (*)     All other components within normal limits    Narrative:     Heather Esparzata tel. [phone],  TROP results called to and read back by DR BARR, 02/19/2023 04:34, by [facility]   POCT ARTERIAL - Abnormal; Notable for the following components:    POC Glucose 214 (*)     pCO2, Arterial 53 (*)     pO2, Arterial 86 (*)     HCO3, Arterial 32.3 (*)     Base Excess, Arterial 7 (*)     O2 Sat, Arterial 96 (*)     TCO2, Arterial 34 (*)     Lactate 4.34 (*)     POC Hematocrit 21 (*)     Hemoglobin 7.2 (*)     All other components within normal limits   POCT GLUCOSE - Normal   RESPIRATORY PANEL, MOLECULAR, WITH COVID-19   CULTURE, BLOOD 1   CULTURE, BLOOD 1   MAGNESIUM   BRAIN NATRIURETIC PEPTIDE    Narrative:     De Anda Dari PHILLIPSED tel. [phone],  TROP results called to and read back by DR BARR, 02/19/2023 04:34, by OAAMN   MICROSCOPIC URINALYSIS   T4, FREE   TROPONIN   EXTRA TUBE   POCT EPOC BLOOD GAS, LACTIC ACID, ICA       All other labs were within normal range or not returned as of this dictation. EMERGENCY DEPARTMENT COURSE and DIFFERENTIAL DIAGNOSIS/MDM:   Vitals:    Vitals:    02/19/23 0340 02/19/23 0345 02/19/23 0348 02/19/23 0445   BP: (!) 79/48 (!) 85/50 (!) 94/50 (!) 83/49   Pulse: 91 90 89 80   Resp: 26 25 23 21   Temp:       SpO2: 100% 100% 100% 100%       Chronic anemia noted. Compared to previous, baseline. Leukocytosis noted, elevated lactic acid. Elevated troponin, will trend. Given rectal ASA. Possibly secondary to elevated BNP. Pt dehyrated. Also could be elevated secondary to CPR and respiratory arrest.  CT head with no intracranial process. Chronic findings noted. Pulmonary embolism ruled out. Medical Decision Making  Amount and/or Complexity of Data Reviewed  Labs: ordered. Radiology: ordered. ECG/medicine tests: ordered. Risk  OTC drugs. Prescription drug management. Decision regarding hospitalization. Postarrest care.   Patient given IV fluid bolus for hypotension, her airway is secured. Using chlorhexidine, landmarks, single lumen 14-gauge Angiocath placed left external jugular vein, draws, flushes, secured with tape, no complications  Patient is heparinized, I doubt pulmonary embolism  Seems the patient had a respiratory arrest, her EKG is nonischemic and at no point was she in a shockable rhythm, I do not believe there is indication for emergent cardiac catheterization  Given her GCS at baseline and suspected etiology of sepsis I do not think she is a candidate for therapeutic hypothermia, cooling. Unable to maintain appropriate blood pressure without pressors is also another contraindication. Given Dedra Sinha for suspected sepsis  Rib fractures suspected secondary to chest compressions  Spoke to dr Annette Zambrano, cardiology, no further recommendations at this time, will be on as a consult  Dr tellez accepts admission    REASSESSMENT      BEHAVIORAL SERVICES: One - Hour In- Person Review  For Management of Violent or Self - Destructive Behavior    Seclusion/Restraint:  bilateral upper    Reason for Intervention: protection of tubes/lines    Response to Intervention:  adequate    Medical Record reviewed and discussed precipitating events/behaviors with RN initiating Intervention:  Yes    Patient Medical Status:  Vital Signs: 79/48  Respiratory Status: vent  Circulatory Status: perfusing  Skin Integrity: WNL    Orientation: unresponsive    Mood/Affect: unresponsive    Speech: unresponsive    Thought Content: unresponsive    Thought Processes: unresponsive    Rationale for continued use of intervention: protect lines/tubes    Rationale for discontinuing intervention: Patient is able to: One Hour Review Evaluation Physician Notification:  continued      CRITICAL CARE TIME   Total Critical Care time was 30 minutes, excluding separately reportable procedures.   There was a high probability of clinically significant/life threatening deterioration in the patient's condition which required my urgent intervention. High likelihood of clinical deterioration, frequent rechecks, IV pressors    CONSULTS:  None    PROCEDURES:  Unless otherwise noted below, none     Procedures        FINAL IMPRESSION      1. Cardiopulmonary arrest (HCC)    2. Elevated troponin    3. Elevated brain natriuretic peptide (BNP) level    4. Anemia, unspecified type    5. Elevated liver enzymes    6. Tracheostomy dependence ([facility] [address]    7. Loculated pleural effusion    8. Lung mass    9. Closed fracture of multiple ribs, unspecified laterality, initial encounter          DISPOSITION/PLAN   DISPOSITION Decision To Admit 02/19/2023 03:58:24 AM      PATIENT REFERRED TO:  No follow-up provider specified. DISCHARGE MEDICATIONS:  New Prescriptions    No medications on file     Controlled Substances Monitoring:     No flowsheet data found.     (Please note that portions of this note were completed with a voice recognition program.  Efforts were made to edit the dictations but occasionally words are mis-transcribed.)    Kamille Coley MD (electronically signed)  Attending Emergency Physician            Kamille Coley MD  02/19/23 0518       Dedra Perez MD  02/19/23 7906

## 2023-02-19 NOTE — ED NOTES
Patient presents to the er via [facility] with [facility] post respiratory arrest  Patient has been a patient at [facility] since 2/14  Per nurse from [facility], patients vent alarm was going off, respiratory went to suction patient and had a hard time passing the catheter, and patient went asystole, compression were started at [address] [place] [address] 1 epi given, compressions continued  0228 1 epi given   Patient was johann at the time, atropine given  0232 LC arrived  Patient left Speciality at 0238    Patient arrived to trauma room at [facility] [place]  Patient being bagged by [facility]  GCS 3  Respiratory therapist placed patient on ventilator via trach  Rectal tube in place  Latham in place  IV to right hand  JEFRY drain to left abdomen  NG in place  Trach in place       Yaritza Clayton RN  02/19/23 [id number] Maricruz Arias RN  02/19/23 [id number] Maricruz Arias RN  02/19/23 0336

## 2023-02-19 NOTE — PROGRESS NOTES
Received report from [facility]. Skin assessment completed with night shift RN and this RN. Arctic sun protocol initiated at 0730. Pt remains unresponsive on vent, with occasional twitching/seizures. Spouse in to visit and expressed wishes to change code status to comfort care. Dr Broderick navarrete served but spouse left before conversation could be had about code status change.

## 2023-02-19 NOTE — CONSULTS
Consult  Patient: [facility]  Unit/Bed:IC04/IC04-01  YOB: 1950  MRN: [id number]  Acct: [de-identified]   Admitting Diagnosis: Cardiac arrest [facility]) [I46.9]  Cardiopulmonary arrest ([facility] [address] [I46.9]  Lung mass [R91.8]  Elevated troponin [R77.8]  Tracheostomy dependence ([facility] [address] [Z93.0]  Elevated brain natriuretic peptide (BNP) level [R79.89]  Elevated liver enzymes [R74.8]  Loculated pleural effusion [J90]  Closed fracture of multiple ribs, unspecified laterality, initial encounter [S22.49XA]  Anemia, unspecified type [D64.9]  Admit Date:  2/19/2023  Hospital Day: 0      Chief Complaint:   effusion    History of Present Illness:   Pt admitted after resp arrest.  Recent surgery at another hospital for left thoracotomy and evacuation of hemothorax.   CT after arrest with loculated left effusion    No Known Allergies    Current Facility-Administered Medications   Medication Dose Route Frequency Provider Last Rate Last Admin    magnesium sulfate 2000 mg in 50 mL IVPB premix  2,000 mg IntraVENous PRN Darline Bence Sedar, DO        chlorhexidine (PERIDEX) 0.12 % solution 15 mL  15 mL Mouth/Throat BID Susen Meth D Sedar, DO        famotidine (PEPCID) 20 mg in sodium chloride (PF) 0.9 % 10 mL injection  20 mg IntraVENous BID Susen Meth D Sedar, DO        glucose chewable tablet 16 g  4 tablet Oral PRN Susen Meth D Sedar, DO        dextrose bolus 10% 125 mL  125 mL IntraVENous PRN Susen Meth D Sedar, DO        Or    dextrose bolus 10% 250 mL  250 mL IntraVENous PRN Susen Meth D Sedar, DO        glucagon (rDNA) injection 1 mg  1 mg SubCUTAneous PRN Susen Meth D Sedar, DO        dextrose 10 % infusion   IntraVENous Continuous PRN Shoaib D Sedar, DO        insulin lispro (HUMALOG) injection vial 0-4 Units  0-4 Units SubCUTAneous TID WC Shoaib D Sedar, DO        insulin lispro (HUMALOG) injection vial 0-4 Units  0-4 Units SubCUTAneous Nightly Shoaib D Sedar, DO        Levothyroxine Sodium (SYNTHROID) 100 MCG/5ML injection 100 mcg  100 mcg IntraVENous Daily Darline Bence Sedar, DO meropenem (MERREM) 1,000 mg in sodium chloride 0.9 % 100 mL IVPB (mini-bag)  1,000 mg IntraVENous Q8H Shoaib PLAZA Sedar, DO        vancomycin 1000 mg IVPB in 250 mL NS addavial  1,000 mg IntraVENous Q12H Shoaib BOLA Sedar, DO        ondansetron (ZOFRAN-ODT) disintegrating tablet 4 mg  4 mg Oral Q8H PRN Fadi Jesse Sedar, DO        Or    ondansetron (ZOFRAN) injection 4 mg  4 mg IntraVENous Q6H PRN Dai Dorothea D Sedar, DO        norepinephrine (LEVOPHED) 16 mg in sodium chloride 0.9 % 250 mL infusion  1-50 mcg/min IntraVENous Continuous Fadi Jesse Sedar, DO 7.5 mL/hr at 02/19/23 0716 8 mcg/min at 02/19/23 0716    insulin glargine (LANTUS) injection vial 24 Units  24 Units SubCUTAneous Nightly Shoaib PLAZA Sedar, DO        propofol injection  5-50 mcg/kg/min IntraVENous Continuous Shoaib PLAZA Sedar, DO        enoxaparin (LOVENOX) injection 100 mg  1 mg/kg SubCUTAneous BID Malachi Kirks, DO           PMHx:  Past Medical History:   Diagnosis Date    Bladder carcinoma ([facility] [address]     CAD (coronary artery disease)     COPD (chronic obstructive pulmonary disease) ([facility] [address]     Diabetes mellitus ([facility] [address]     DVT (deep venous thrombosis) ([facility])     right upper ext    Simona gangrene     Hemothorax on left     Hypertension     Metabolic encephalopathy     Necrotizing fasciitis ([facility] [address]     Respiratory failure (HCC)        PSHx:  Past Surgical History:   Procedure Laterality Date    BILATERAL VATS ABLATION      TRACHEOSTOMY         Social Hx:  Social History     Socioeconomic History    Marital status:      Spouse name: None    Number of children: None    Years of education: None    Highest education level: None       Family Hx:  History reviewed. No pertinent family history.     Review ofSystems:   Review of Systems       Physical Examination:    BP (!) 172/68   Pulse 69   Temp 98.1 °F (36.7 °C)   Resp 23   Ht 5' 5\" (1.651 m)   Wt 215 lb 6.2 oz (97.7 kg)   SpO2 100%   BMI 35.84 kg/m²    Physical Exam     LABS:  CBC:   Lab Results   Component Value Date/Time WBC 36.4 02/19/2023 03:30 AM    RBC 2.83 02/19/2023 03:30 AM    HGB 7.9 02/19/2023 03:30 AM    HCT 27.4 02/19/2023 03:30 AM    MCV 97.0 02/19/2023 03:30 AM    MCH 28.0 02/19/2023 03:30 AM    MCHC 28.8 02/19/2023 03:30 AM    RDW 19.4 02/19/2023 03:30 AM     02/19/2023 03:30 AM     CBC with Differential:   Lab Results   Component Value Date/Time    WBC 36.4 02/19/2023 03:30 AM    RBC 2.83 02/19/2023 03:30 AM    HGB 7.9 02/19/2023 03:30 AM    HCT 27.4 02/19/2023 03:30 AM     02/19/2023 03:30 AM    MCV 97.0 02/19/2023 03:30 AM    MCH 28.0 02/19/2023 03:30 AM    MCHC 28.8 02/19/2023 03:30 AM    RDW 19.4 02/19/2023 03:30 AM    BANDSPCT 9 02/19/2023 03:30 AM    METASPCT 5 02/19/2023 03:30 AM    LYMPHOPCT 4.0 02/19/2023 03:30 AM    MONOPCT 6.7 02/19/2023 03:30 AM    BASOPCT 1.0 02/19/2023 03:30 AM    MONOSABS 2.5 02/19/2023 03:30 AM    LYMPHSABS 1.5 02/19/2023 03:30 AM    EOSABS 1.8 02/19/2023 03:30 AM    BASOSABS 0.0 02/19/2023 03:30 AM     CMP:    Lab Results   Component Value Date/Time     02/19/2023 03:30 AM    K 4.8 02/19/2023 03:30 AM    CL 98 02/19/2023 03:30 AM    CO2 29 02/19/2023 03:30 AM    BUN 30 02/19/2023 03:30 AM    CREATININE 0.81 02/19/2023 03:30 AM    CREATININE 0.9 02/19/2023 03:29 AM    LABGLOM >60.0 02/19/2023 03:30 AM    GLUCOSE 243 02/19/2023 03:30 AM    PROT 5.3 02/19/2023 03:30 AM    LABALBU 2.5 02/19/2023 03:30 AM    CALCIUM 8.8 02/19/2023 03:30 AM    BILITOT <0.2 02/19/2023 03:30 AM    ALKPHOS 243 02/19/2023 03:30 AM    AST 95 02/19/2023 03:30 AM    ALT 64 02/19/2023 03:30 AM     BMP:    Lab Results   Component Value Date/Time     02/19/2023 03:30 AM    K 4.8 02/19/2023 03:30 AM    CL 98 02/19/2023 03:30 AM    CO2 29 02/19/2023 03:30 AM    BUN 30 02/19/2023 03:30 AM    LABALBU 2.5 02/19/2023 03:30 AM    CREATININE 0.81 02/19/2023 03:30 AM    CREATININE 0.9 02/19/2023 03:29 AM    CALCIUM 8.8 02/19/2023 03:30 AM    LABGLOM >60.0 02/19/2023 03:30 AM    GLUCOSE 243 02/19/2023 03:30 AM     Magnesium:    Lab Results   Component Value Date/Time    MG 1.7 02/19/2023 03:30 AM     Troponin:    Lab Results   Component Value Date/Time    TROPONINI 0.105 02/19/2023 05:00 AM     Recent Labs     02/19/23  0330   PROBNP 7,629     Recent Labs     02/19/23  0343   INR 1.3       RADIOLOGY:  CT HEAD WO CONTRAST    Result Date: 2/19/2023  EXAMINATION: CT OF THE HEAD WITHOUT CONTRAST  2/19/2023 4:38 am TECHNIQUE: CT of the head was performed without the administration of intravenous contrast. Automated exposure control, iterative reconstruction, and/or weight based adjustment of the mA/kV was utilized to reduce the radiation dose to as low as reasonably achievable. COMPARISON: None. HISTORY: ORDERING SYSTEM PROVIDED HISTORY: AMS TECHNOLOGIST PROVIDED HISTORY: Reason for exam:->AMS Has a \"code stroke\" or \"stroke alert\" been called? ->No Decision Support Exception - unselect if not a suspected or confirmed emergency medical condition->Emergency Medical Condition (MA) What reading provider will be dictating this exam?->CRC FINDINGS: BRAIN/VENTRICLES: Generalized atrophy identified of the brain with low-attenuation areas seen in the periventricular and subcortical white matter to suggest chronic small vessel ischemic change. There is no acute intracranial hemorrhage, mass effect or midline shift. No abnormal extra-axial fluid collection. The gray-white differentiation is maintained without evidence of an acute infarct. There is no evidence of hydrocephalus. ORBITS: The visualized portion of the orbits demonstrate no acute abnormality. SINUSES: The visualized paranasal sinuses demonstrate no acute abnormality. Fluid identified in the mastoid air cells bilaterally. There is mucosal thickening seen throughout the ethmoid air cells and sphenoid sinuses suggesting a chronic sinusitis. SOFT TISSUES/SKULL:  No acute abnormality of the visualized skull or soft tissues.      Atrophy and chronic changes seen within the brain with no acute intracranial abnormality. Fluid in the mastoid air cells bilaterally. Chronic sinusitis involving the ethmoid air cells and sphenoid sinuses bilaterally. CTA CHEST W WO CONTRAST    Result Date: 2/19/2023  EXAMINATION: CTA OF THE CHEST WITH AND WITHOUT CONTRAST 2/19/2023 4:38 am TECHNIQUE: CTA of the chest was performed before and after the administration of intravenous contrast.  Multiplanar reformatted images are provided for review. MIP images are provided for review. Automated exposure control, iterative reconstruction, and/or weight based adjustment of the mA/kV was utilized to reduce the radiation dose to as low as reasonably achievable. COMPARISON: None. HISTORY: ORDERING SYSTEM PROVIDED HISTORY: r/o PE, PNA TECHNOLOGIST PROVIDED HISTORY: Reason for exam:->r/o PE, PNA Decision Support Exception - unselect if not a suspected or confirmed emergency medical condition->Emergency Medical Condition (MA) What reading provider will be dictating this exam?->CRC FINDINGS: Pulmonary Arteries: Pulmonary arteries are adequately opacified for evaluation. No evidence of intraluminal filling defect to suggest pulmonary embolism. Main pulmonary artery is normal in caliber. Mediastinum: Tracheostomy tube is in place. There is a feeding tube identified within the esophagus. Coronary artery calcification is identified. No pericardial effusion. No evidence of mediastinal lymphadenopathy. The heart and pericardium demonstrate no acute abnormality. There is no acute abnormality of the thoracic aorta. Lungs/pleura: Moderate bilateral pleural effusions appearing to be loculated. Septal thickening and central interstitial prominence reflecting interstitial edema and superimposed infiltrate cannot be excluded. There is a nodular infiltrate measuring 2.5 cm in the right middle lobe in which continued follow-up is recommended to exclude possible malignancy in this area.  Upper Abdomen: Limited images of the upper abdomen are unremarkable. Soft Tissues/Bones: Nondisplaced left anterior 3rd through 6th rib fractures. Right anterior displaced 3rd rib fracture and nondisplaced 4th through 6th rib fractures. No evidence of pulmonary embolism. Moderate bilateral loculated pleural effusions. There is interseptal thickening and prominence suggesting interstitial edema with likely superimposed infiltrate at the lung bases. Question of a masslike density in the right middle lobe in which close interval follow-up is recommended to exclude a possible underlying lesion. Multiple anterior nondisplaced rib fracture seen bilaterally. RECOMMENDATIONS: Unavailable     CT ABDOMEN PELVIS W IV CONTRAST Additional Contrast? None    Result Date: 2/19/2023  EXAMINATION: CT OF THE ABDOMEN AND PELVIS WITH CONTRAST 2/19/2023 4:38 am TECHNIQUE: CT of the abdomen and pelvis was performed with the administration of intravenous contrast. Multiplanar reformatted images are provided for review. Automated exposure control, iterative reconstruction, and/or weight based adjustment of the mA/kV was utilized to reduce the radiation dose to as low as reasonably achievable. COMPARISON: None. HISTORY: ORDERING SYSTEM PROVIDED HISTORY: sepsis TECHNOLOGIST PROVIDED HISTORY: Reason for exam:->sepsis Additional Contrast?->None Decision Support Exception - unselect if not a suspected or confirmed emergency medical condition->Emergency Medical Condition (MA) What reading provider will be dictating this exam?->CRC FINDINGS: Lower Chest: See the report of the chest for full details. Organs: The liver is homogeneous in appearance with mild periportal edema. Possible sludge identified in the gallbladder with no discrete stones. No intrahepatic or extrahepatic biliary ductal dilatation. The pancreas is homogeneous. The spleen is unremarkable. Both adrenal glands are within normal limits. Symmetric enhancement of the renal parenchyma.   No stones or distension seen in the renal collecting system. GI/Bowel: The stomach is under distended. No wall thickening. Small bowel is within normal limits. No distension or mucosal abnormality. The colon is under distended. Wall thickening of the rectosigmoid region cannot be completely excluded. Rectal tube is in place. Pelvis: Latham catheter balloon within the bladder. Uterus is unremarkable. Peritoneum/Retroperitoneum: No abdominal retroperitoneal lymphadenopathy. Extensive atherosclerotic disease diffusely throughout the abdominal aorta and iliac vessels. Cannot exclude a moderate to high-grade stenosis of the distal abdominal aorta at the bifurcation. No pelvic adenopathy. Bones/Soft Tissues:  linear tubing or drainage catheter in the subcutaneous tissues along the left inguinal region within the pannus. Clinical correlation is needed. The bony structures are unremarkable. Anasarca seen within the soft tissues. Rectal tube in place. Decompression identified of the bowel predominately in the recto sigmoid region in which a mild colitis cannot be excluded. Clinical correlation is needed. Drainage catheter identified within the subcutaneous tissues in the left inguinal region. Clinical correlation is needed. Extensive atherosclerotic disease involving the abdominal aorta and iliac vessels. Moderate to high-grade stenosis cannot be excluded of the distal abdominal aorta at the bifurcation. XR CHEST PORTABLE    Result Date: 2/19/2023  EXAMINATION: ONE XRAY VIEW OF THE CHEST 2/19/2023 3:27 am COMPARISON: None. HISTORY: ORDERING SYSTEM PROVIDED HISTORY: post arrest TECHNOLOGIST PROVIDED HISTORY: Reason for exam:->post arrest What reading provider will be dictating this exam?->CRC FINDINGS: Cardiac borders obscured. Moderate bilateral perihilar and interstitial lung infiltrates suggesting pulmonary edema. Small bilateral pleural effusions may be present.   Minimally displaced fracture left lateral 7th rib.  No pneumothorax or subcutaneous emphysema. Left PICC tip well positioned at the cavoatrial junction. Tracheostomy in good position. Gastric feeding tube appears in good position. 1. Moderate perihilar pulmonary edema and small bilateral pleural effusions. 2. Minimally displaced fracture left lateral 7th rib. No pneumothorax or subcutaneous emphysema. 3. Tracheostomy, feeding tube, and left PICC appear with good position. RECOMMENDATION: Careful clinical correlation and follow up recommended. EKG:       Assessment:    Active Hospital Problems    Diagnosis Date Noted    Cardiac arrest [facility]) [I46.9] 02/19/2023     Priority: Medium      Loculated posterior left effusion with small-mod basilar effusion    Plan:  Since there are no air fluid levels, this may be residual effusion after chest tube removal after thoracotomy for hemothorax. Will follow and not intervene surgically at this time. If fluid increased, then could consider perc drainage or redo VATS this week.   For now will observe and see how she recovers from recent arrest.        Electronically signed by Regina Marshall MD on 2/19/2023 at 7:43 AM

## 2023-02-19 NOTE — CONSULTS
Pulmonary and Critical Care Medicine  Consult Note  Encounter Date: 2023 7:50 AM    Ms. Arlene Connor is a 81 y.o. female  : 1950  Requesting Provider: Chaya Palma MD    Reason for request: ICU management            HISTORY OF PRESENT ILLNESS:    Patient is 81 y.o. presents with post cardiac arrest, patient transferred today from LTAC due to mucous plugging and hypoxia, leading to cardiac arrest, ROSC was obtained after around 4 minutes, patient currently unresponsive, pupils dilated, sluggish reaction, she is on Levophed drip, per notes patient intermittently withdraws to pain and was arousable to sternal rub. Patient has complicated past medical history, she has history of necrotizing fasciitis s/p resection and penectomy, history of empyema on the left, status post VATS and decortication she is status post tracheostomy currently in LTAC. She has history of DVT right upper extremity at the PICC line site        Past Medical History:        Diagnosis Date    Bladder carcinoma ([address]     CAD (coronary artery disease)     COPD (chronic obstructive pulmonary disease) ([address]     Diabetes mellitus ([address]     DVT (deep venous thrombosis) (HCC)     right upper ext    Simona gangrene     Hemothorax on left     Hypertension     Metabolic encephalopathy     Necrotizing fasciitis ([address]     Respiratory failure ([address]        Past Surgical History:        Procedure Laterality Date    BILATERAL VATS ABLATION      TRACHEOSTOMY         Social History:         Family History:   History reviewed. No pertinent family history. Allergies:  Patient has no known allergies.         MEDICATIONS during current hospitalization:    Continuous Infusions:   dextrose      norepinephrine 8 mcg/min (23 0716)    propofol         Scheduled Meds:   chlorhexidine  15 mL Mouth/Throat BID    famotidine (PEPCID) injection  20 mg IntraVENous BID    insulin lispro  0-4 Units SubCUTAneous TID WC    insulin lispro  0-4 Units SubCUTAneous Nightly    Levothyroxine Sodium  100 mcg IntraVENous Daily    meropenem  1,000 mg IntraVENous Q8H    vancomycin  1,000 mg IntraVENous Q12H    insulin glargine  24 Units SubCUTAneous Nightly    enoxaparin  1 mg/kg SubCUTAneous BID       PRN Meds:magnesium sulfate, glucose, dextrose bolus **OR** dextrose bolus, glucagon (rDNA), dextrose, ondansetron **OR** ondansetron        REVIEW OF SYSTEMS:  ROS: 10 organs review of system is done including general, psychological, ENT, hematological, endocrine, respiratory, cardiovascular, gastrointestinal, musculoskeletal, neurological,  allergy and Immunology is done and is otherwise negative. PHYSICAL EXAM:    Vitals:  BP (!) 172/68   Pulse 69   Temp 98.1 °F (36.7 °C)   Resp 23   Ht 5' 5\" (1.651 m)   Wt 215 lb 6.2 oz (97.7 kg)   SpO2 100%   BMI 35.84 kg/m²     General: On vent, unresponsive,  Head: normocephalic, atraumatic  Eyes: Dilated pupils, sluggish reaction to light  ENT:  MMM no lesions  Neck:  supple and no masses  Chest : Vent sounds, minimal basal rales, no wheezing, nontender, tympanic  Heart[de-identified] Heart sounds are normal.  Regular rate and rhythm without murmur, gallop or rub. ABD:  symmetric, soft, no apparent tenderness, surgical wound with sutures along with the left lower quadrant, extend to the genital area and buttocks no drainage, she has JFERY drain and PEG tube. Musculoskeletal : no cyanosis, no clubbing, and no edema  Neuro:   Unresponsive  Skin: No rashes or nodules noted.   Lymph node:  no cervical nodes  Urology: Yes [facility]   Psychiatric: unresponsive        Data Review  Recent Labs     02/19/23 0329 02/19/23 0330   WBC  --  36.4*   HGB 7.2* 7.9*   HCT  --  27.4*   PLT  --  371      Recent Labs     02/19/23 0327 02/19/23 0329 02/19/23 0330   NA  --   --  142   K  --   --  4.8   CL  --   --  98   CO2  --   --  29   BUN  --   --  30*   CREATININE  --  0.9 0.81   GLUCOSE 261  --  243*       MV Settings:     Vent Mode: AC/VC  Vt (Set, mL): 350 mL  Resp Rate (Set): 18 bmp  PEEP/CPAP (cmH2O): 5  Peak Inspiratory Pressure (cmH2O): 32 cmH2O  Mean Airway Pressure (cmH2O): 13 cmH20  I:E Ratio: 1:2    ABGs:   Recent Labs     02/19/23  0329   PHART 7.393   IDM0URY 53*   PO2ART 86*   UBT2TNW 32.3*   BEART 7*   [id number] 96*   RHA5WVF 34*     O2 Device: Ventilator  No results found for: [address]    Radiology  I personally reviewed imaging studies and CT chest shows loculated pleural effusion bilaterally and multilobar infiltrates    CT abdomen possible mild colitis    Assessment, plan: This is a critically ill patient at risk of deterioration / death , needing close ICU monitoring and intervention due to below noted problems   Acute on chronic hypoxic and hypercapnic respiratory failure  Multilobar pneumonia  Mucous plugging  Bilateral loculated pleural effusion likely chronic at this point  Septic shock, and probable cardiogenic shock  Post cardiac arrest  Acute encephalopathy worsening from baseline postcardiac arrest  Hypothyroidism  Demand ischemia, non-ST elevation MI  Hyperglycemia  UTI     Recommendation  Vent support lung protective strategy  head of the bed 30°  Daily sedation holidays and breathing trials  Sedation with combination propofol and fentanyl target R ASS of 0 to -1  Watch for ICU delirium: TV on, natural light, avoid benzos, pain control, early mobility, and family engagement  PUD prophylaxis  DVT prophylaxis  Target blood sugar 140-180  Levophed to maintain MAP ~65-70  Monitor and replace electrolyte as needed  Continue broad-spectrum antibiotic  Targeted hypothermia protocol  Resume Synthroid       is on his way and, CODE STATUS changed to DNR CCA, he is considering hospice and consult placed. ARPAN Russell Mai   Thank you for consultation  Due to the immediate potential for life-threatening deterioration due to postcardiac arrest, shock and respiratory failure, I spent 40 minutes providing critical care.   This time is excluding time spent performing procedures.     Electronically signed by Cassie Cates MD, [facility],  on 2/19/2023 at 7:50 AM

## 2023-02-19 NOTE — CONSULTS
EMERGENCY GENERAL SURGERY CONSULTATION    Reason for Consultation:  Possible decortication  Consulting Provider: Hina Hills MD    HISTORY OF PRESENT INJURY:   Ewelina Jimenez is a 72 y.o. female who is admitted to the ICU s/p cardiac arrest from local LTAC.    Pt is known to Trauma Surgery/EGS service due to management of empyema with VATS at [facility]. Hospitalist team consulting today for possible decortication.    Of note, per nursing, there will be a family discussion today regarding patients goals of care. Pt is currently undergoing TTM with arctic sun. Per nursing he is unresponsive.       PMH:    Past Medical History:   Diagnosis Date    Bladder carcinoma (HCC)     CAD (coronary artery disease)     COPD (chronic obstructive pulmonary disease) (HCC)     Diabetes mellitus (HCC)     DVT (deep venous thrombosis) (HCC)     right upper ext    Simona gangrene     Hemothorax on left     Hypertension     Metabolic encephalopathy     Necrotizing fasciitis (HCC)     Respiratory failure (HCC)        PSH:    Past Surgical History:   Procedure Laterality Date    BILATERAL VATS ABLATION      TRACHEOSTOMY         FH:    History reviewed. No pertinent family history.    SH:         Home Medications:  No current facility-administered medications on file prior to encounter.     Current Outpatient Medications on File Prior to Encounter   Medication Sig Dispense Refill    potassium chloride (MICRO-K) 10 MEQ extended release capsule Take 10 mEq by mouth daily      furosemide (LASIX) 10 MG/ML injection Infuse 40 mg intravenously daily      lactulose (CEPHULAC) 20 g packet 20 g by Per NG tube route 2 times daily      insulin glargine (LANTUS) 100 UNIT/ML injection vial Inject 24 Units into the skin nightly      Sodium Hypochlorite (DAKINS) 0.125 % SOLN Irrigate with as directed daily      collagenase 250 UNIT/GM ointment Apply topically daily Apply topically daily.      Multiple Vitamins-Minerals (THERAPEUTIC  MULTIVITAMIN-MINERALS) tablet 1 tablet by PEG Tube route daily      sodium zirconium cyclosilicate (LOKELMA) 5 g PACK oral suspension 5 g in the morning and at bedtime      acetylcysteine (MUCOMYST) 20 % nebulizer solution Inhale 70 mg/kg into the lungs 2 times daily      albuterol (ACCUNEB) 0.63 MG/3ML nebulizer solution Take 1 ampule by nebulization every 6 hours as needed for Wheezing      melatonin 3 MG TABS tablet Take 10 mg by mouth at bedtime      levothyroxine (SYNTHROID) 100 MCG tablet Take 137.5 mcg by mouth Daily      ascorbic acid (VITAMIN C) 500 MG tablet Take 500 mg by mouth daily      Heparin Sodium, Porcine, (HEPARIN, PORCINE,) 56650 UNIT/ML injection Inject 10,000 Units into the skin in the morning and 10,000 Units at noon and 10,000 Units in the evening. sertraline (ZOLOFT) 50 MG tablet Take 50 mg by mouth daily      QUEtiapine (SEROQUEL) 25 MG tablet Take 50 mg by mouth daily      QUEtiapine (SEROQUEL) 100 MG tablet Take 100 mg by mouth at bedtime      omeprazole (PRILOSEC) 20 MG delayed release capsule Take 40 mg by mouth daily      insulin lispro (HUMALOG) 100 UNIT/ML injection vial Inject 5 Units into the skin 3 times daily (before meals)      nystatin (MYCOSTATIN) POWD powder Apply topically 2 times daily      metoprolol tartrate (LOPRESSOR) 25 MG tablet Take 12.5 mg by mouth 2 times daily      metOLazone (ZAROXOLYN) 5 MG tablet Take 5 mg by mouth daily      meropenem (MERREM) 1 g injection Infuse 1,000 mg intravenously in the morning and 1,000 mg at noon and 1,000 mg in the evening. atorvastatin (LIPITOR) 80 MG tablet Take 80 mg by mouth at bedtime      acetaminophen (TYLENOL) 160 MG/5ML liquid Take 15 mg/kg by mouth every 6 hours as needed for Fever         Review Of Systems:  Unable to obtain due to patient's mental status. Except as noted above the remainder of the review of systems was reviewed and negative.      PHYSICAL EXAM:  Vitals: BP (!) 165/83   Pulse 74   Temp 97 °F (36.1 °C) (Rectal)   Resp 24   Ht 5' 5\" (1.651 m)   Wt 215 lb 6.2 oz (97.7 kg)   SpO2 100%   BMI 35.84 kg/m²   Constituational: Laying in bed. Unresponsive. No family at bedside this AM.   Cardiovascular: Regular rate and sinus rhythm on monitor. Pulmonary: On vent with trach in place. Pt appears to be breathing over vent. Symmetric chest rise. Abdominal: Soft. Non-distended. Healing panniculectomy incision at pannus and tracking up to left flank. Appears d/d/I with sutures in place. Musculoskeletal: B/l LE edema. No abrasions, lacs, or ecchymosis to extremities. Neurological: Unresponsive. No sedation on board. Does spontaneously move extremities with small twitches.         BASIC LABS:  CBC with Differential:    Lab Results   Component Value Date/Time    WBC 36.4 02/19/2023 03:30 AM    RBC 2.83 02/19/2023 03:30 AM    HGB 7.9 02/19/2023 03:30 AM    HCT 27.4 02/19/2023 03:30 AM     02/19/2023 03:30 AM    MCV 97.0 02/19/2023 03:30 AM    MCH 28.0 02/19/2023 03:30 AM    MCHC 28.8 02/19/2023 03:30 AM    RDW 19.4 02/19/2023 03:30 AM    BANDSPCT 9 02/19/2023 03:30 AM    METASPCT 5 02/19/2023 03:30 AM    LYMPHOPCT 4.0 02/19/2023 03:30 AM    MONOPCT 6.7 02/19/2023 03:30 AM    BASOPCT 1.0 02/19/2023 03:30 AM    MONOSABS 2.5 02/19/2023 03:30 AM    LYMPHSABS 1.5 02/19/2023 03:30 AM    EOSABS 1.8 02/19/2023 03:30 AM    BASOSABS 0.0 02/19/2023 03:30 AM     CMP:    Lab Results   Component Value Date/Time     02/19/2023 08:04 AM    K 5.1 02/19/2023 08:04 AM     02/19/2023 08:04 AM    CO2 30 02/19/2023 08:04 AM    BUN 32 02/19/2023 08:04 AM    CREATININE 0.65 02/19/2023 08:04 AM    CREATININE 0.9 02/19/2023 03:29 AM    LABGLOM >60.0 02/19/2023 08:04 AM    GLUCOSE 212 02/19/2023 08:04 AM    PROT 5.3 02/19/2023 03:30 AM    LABALBU 2.5 02/19/2023 03:30 AM    CALCIUM 8.6 02/19/2023 08:04 AM    BILITOT <0.2 02/19/2023 03:30 AM    ALKPHOS 243 02/19/2023 03:30 AM    AST 95 02/19/2023 03:30 AM    ALT 64 02/19/2023 03:30 AM     Magnesium:  Lab Results   Component Value Date/Time    MG 1.8 02/19/2023 08:04 AM     Troponin:    Lab Results   Component Value Date/Time    TROPONINI 0.105 02/19/2023 05:00 AM       IMAGING:    CTA Chest: No evidence of pulmonary embolism. Moderate bilateral loculated pleural effusions. There is interseptal thickening and prominence suggesting interstitial edema with likely superimposed infiltrate at the lung bases. Question of a masslike density in the right middle lobe in which close interval follow-up is recommended to exclude a possible underlying lesion. Multiple anterior nondisplaced rib fracture seen bilaterally. ASSESSMENT/RECOMMENDATIONS:  Jerrod Lerma is a 81 y.o. female who is admitted to the ICU s/p cardiac arrest from local LTAC. Pt is known to Trauma Surgery/EGS service due to management of empyema with VATS at [facility]. Hospitalist team consulting today for possible decortication    - No significant rind noted on CT, and no air to suggest abscess at this time. Appears to be loculated simple fluid. - Recommend IR drainage as first line intervention prior considering VATS procedure  - Will respectfully defer decortication procedure, if needed, to Cardiothoracic surgery, as management of thoracic complications is their specialty. Will continue to be available with questions/concerns regarding this patient's case as needed. Please call with questions/concerns. Joshua Chua PA-C  Trauma/Critical Care  Emergency General Surgery  [See treatment team sticky note for contact information]      This patient was seen with, and plan care was discussed with EGS attending physician, Dr. Yoel Johnson.

## 2023-02-19 NOTE — H&P
Hospital Medicine  History and Physical    Patient:  Leon Grimes  MRN: [id number]    CHIEF COMPLAINT:    Chief Complaint   Patient presents with    Respiratory Arrest     Post resp arrest at [facility], arrested at [address] From:  patient, electronic medical record  Primary Care Physician: No primary care provider on file. HISTORY OF PRESENT ILLNESS:   The patient is a 81 y.o. female who presents with cardiac arrest from local LTAC. Patient is a 72-year-old female she was recently had a prolonged hospitalization for necrotizing fasciitis status postsurgical resection and penectomy. Hospitalization was complicated by an empyema on the left side. Patient underwent VATS and decortication for this and has been on antibiotics at the [facility] for this. Patient was not able to be weaned off the ventilator and transferred to [facility] from [facility] after tracheostomy. A PEG tube apparently was not able to be placed due to anatomical situation and instead patient has been receiving nutrition via [facility]. Hospitalization was also complicated by DVT in the right upper extremity where the PICC line was placed patient also has a known history of CAD COPD diabetes. Per report in the emergency department the patient was apparently had hypoxia with mucus secretions from the trach prior to arrest ROSC was obtained after 4 minutes of CPR. Following ROSC the patient remains minimally responsive she does have a upgoing Babinski bilaterally in the lower extremities and occasionally does wince to pain but she has no spontaneous movements. Pupils are fixed and dilated on my examination the patient only intermittently withdrew to pain. She was arousable to sternal rub. Patient is unable to provide any meaningful history. On evaluation in the emergency department patient does have an elevated BUN at 30 she has a leukocytosis of 36.4.   Patient has a lactic acid 4.34 with a troponin of 0.10.  TSH is elevated at 33.49 and patient as noted has a leukocytosis 36.4 with left shift and bandemia. Respiratory viral panel is negative and urinalysis is benign    CT head shows atrophy with chronic changes no acute process there is note of chronic sinusitis. CTA chest negative for PE there is bilateral loculated pleural effusions with possible masslike density in the right middle lobe. CT abdomen pelvis does show atherosclerotic disease of the abdominal aorta with moderate to high-grade stenosis at the distal abdominal aorta at the bifurcation otherwise there is no acute process appreciated in the abdomen although mild colitis is not able to be excluded though rectal tube is in place    Initially was not able to contact the  regarding goals of care however I was able to reach him later in the morning he is agreeable to hospice consultation after his discussion of poor prognosis but does request that we continue the patient on life support until he can get here later in the morning. He states that they have discussed goals of care and that in the past he she had been DNR as well but this was rescinded to go to the [place] [id number]. Past Medical History:      Diagnosis Date    Bladder carcinoma ([address]     CAD (coronary artery disease)     COPD (chronic obstructive pulmonary disease) ([address]     Diabetes mellitus ([address]     DVT (deep venous thrombosis) (HCC)     right upper ext    Simona gangrene     Hemothorax on left     Hypertension     Metabolic encephalopathy     Necrotizing fasciitis ([address]     Respiratory failure ([address]        Past Surgical History:      Procedure Laterality Date    BILATERAL VATS ABLATION      TRACHEOSTOMY         Medications Prior to Admission:    Prior to Admission medications    Medication Sig Start Date End Date Taking?  Authorizing Provider   potassium chloride (MICRO-K) 10 MEQ extended release capsule Take 10 mEq by mouth daily   Yes Historical Provider, MD   furosemide (LASIX) 10 MG/ML injection Infuse 40 mg intravenously daily   Yes Historical Provider, MD   lactulose (CEPHULAC) 20 g packet 20 g by Per NG tube route 2 times daily   Yes Historical Provider, MD   insulin glargine (LANTUS) 100 UNIT/ML injection vial Inject 24 Units into the skin nightly   Yes Historical Provider, MD   Sodium Hypochlorite (DAKINS) 0.125 % SOLN Irrigate with as directed daily   Yes Historical Provider, MD   collagenase 250 UNIT/GM ointment Apply topically daily Apply topically daily. Yes Historical Provider, MD   Multiple Vitamins-Minerals (THERAPEUTIC MULTIVITAMIN-MINERALS) tablet 1 tablet by PEG Tube route daily   Yes Historical Provider, MD   sodium zirconium cyclosilicate (LOKELMA) 5 g PACK oral suspension 5 g in the morning and at bedtime   Yes Historical Provider, MD   acetylcysteine (MUCOMYST) 20 % nebulizer solution Inhale 70 mg/kg into the lungs 2 times daily   Yes Historical Provider, MD   albuterol (ACCUNEB) 0.63 MG/3ML nebulizer solution Take 1 ampule by nebulization every 6 hours as needed for Wheezing   Yes Historical Provider, MD   melatonin 3 MG TABS tablet Take 10 mg by mouth at bedtime   Yes Historical Provider, MD   levothyroxine (SYNTHROID) 100 MCG tablet Take 137.5 mcg by mouth Daily   Yes Historical Provider, MD   ascorbic acid (VITAMIN C) 500 MG tablet Take 500 mg by mouth daily   Yes Historical Provider, MD   Heparin Sodium, Porcine, (HEPARIN, PORCINE,) 59359 UNIT/ML injection Inject 10,000 Units into the skin in the morning and 10,000 Units at noon and 10,000 Units in the evening.    Yes Historical Provider, MD   sertraline (ZOLOFT) 50 MG tablet Take 50 mg by mouth daily   Yes Historical Provider, MD   QUEtiapine (SEROQUEL) 25 MG tablet Take 50 mg by mouth daily   Yes Historical Provider, MD   QUEtiapine (SEROQUEL) 100 MG tablet Take 100 mg by mouth at bedtime   Yes Historical Provider, MD   omeprazole (PRILOSEC) 20 MG delayed release capsule Take 40 mg by mouth daily   Yes Historical Provider, MD   insulin lispro (HUMALOG) 100 UNIT/ML injection vial Inject 5 Units into the skin 3 times daily (before meals)   Yes Historical Provider, MD   nystatin (MYCOSTATIN) POWD powder Apply topically 2 times daily   Yes Historical Provider, MD   metoprolol tartrate (LOPRESSOR) 25 MG tablet Take 12.5 mg by mouth 2 times daily   Yes Historical Provider, MD   metOLazone (ZAROXOLYN) 5 MG tablet Take 5 mg by mouth daily   Yes Historical Provider, MD   meropenem (MERREM) 1 g injection Infuse 1,000 mg intravenously in the morning and 1,000 mg at noon and 1,000 mg in the evening. Yes Historical Provider, MD   atorvastatin (LIPITOR) 80 MG tablet Take 80 mg by mouth at bedtime   Yes Historical Provider, MD   acetaminophen (TYLENOL) 160 MG/5ML liquid Take 15 mg/kg by mouth every 6 hours as needed for Fever   Yes Historical Provider, MD       Allergies:  Patient has no known allergies. Social History:   TOBACCO:   has no history on file for tobacco use. ETOH:   has no history on file for alcohol use. OCCUPATION:  none    Family History:   History reviewed. No pertinent family history. REVIEW OF SYSTEMS:  Cannot assess    Physical Exam:    Vitals: BP (!) 172/68   Pulse 69   Temp 98.1 °F (36.7 °C)   Resp 23   Wt 215 lb 6.2 oz (97.7 kg)   SpO2 100%   Constitutional: Obese obtunded occasionally withdraws to pain inconsistently  Skin: Skin color, texture, turgor normal. No rashes or lesions  Eyes: Pupils fixed and dilated on my examination  ENT: Head: Normocephalic, no lesions, without obvious abnormality. Neck: Tracheostomy in place  Respiratory: Decreased breath sounds with rhonchi bilaterally and rales bilaterally as well no wheezes.   Drain in the left chest on the lateral side with some scant purulent drainage  Cardiovascular: regular rate and rhythm, S1, S2 normal, no murmur, click, rub or gallop  Gastrointestinal: soft, distended with large ventral abdominal wound extending across the left lateral abdomen down into the inguinal region JEFRY drain in place with a drain in the left labia as well    Genitourinary: Deferred  Musculoskeletal:extremities normal, atraumatic, no cyanosis or edema  Neurologic: Mental status \obtunded with upgoing Babinski bilateral lower extremity occasional nonpurposeful movements  Psychiatric: Cannot assess  Hematologic: No obvious bruising or bleeding    Recent Labs     02/19/23 0329 02/19/23 0330   WBC  --  36.4*   HGB 7.2* 7.9*   PLT  --  371     Recent Labs     02/19/23 0327 02/19/23 0329 02/19/23 0330   NA  --   --  142   K  --   --  4.8   CL  --   --  98   CO2  --   --  29   BUN  --   --  30*   CREATININE  --  0.9 0.81   GLUCOSE 261  --  243*   AST  --   --  95*   ALT  --   --  64*   BILITOT  --   --  <0.2   ALKPHOS  --   --  243*     Troponin T:   Recent Labs     02/19/23 0330 02/19/23  0500   TROPONINI 0.047* 0.105*       ABGs:   Lab Results   Component Value Date/Time    PHART 7.393 02/19/2023 03:29 AM    PO2ART 86 02/19/2023 03:29 AM    SGP5QPV 53 02/19/2023 03:29 AM     INR:   Recent Labs     02/19/23 0343   INR 1.3     URINALYSIS:  Recent Labs     02/19/23 0330   COLORU Yellow   PHUR 6.5   CLARITYU Clear   SPECGRAV 1.018   LEUKOCYTESUR TRACE*   UROBILINOGEN 0.2   BILIRUBINUR Negative   BLOODU SMALL*   GLUCOSEU Negative     -----------------------------------------------------------------   CT HEAD WO CONTRAST    Result Date: 2/19/2023  EXAMINATION: CT OF THE HEAD WITHOUT CONTRAST  2/19/2023 4:38 am TECHNIQUE: CT of the head was performed without the administration of intravenous contrast. Automated exposure control, iterative reconstruction, and/or weight based adjustment of the mA/kV was utilized to reduce the radiation dose to as low as reasonably achievable. COMPARISON: None. HISTORY: ORDERING SYSTEM PROVIDED HISTORY: AMS TECHNOLOGIST PROVIDED HISTORY: Reason for exam:->AMS Has a \"code stroke\" or \"stroke alert\" been called? ->No Decision Support Exception - unselect if not a suspected or confirmed emergency medical condition->Emergency Medical Condition (MA) What reading provider will be dictating this exam?->CRC FINDINGS: BRAIN/VENTRICLES: Generalized atrophy identified of the brain with low-attenuation areas seen in the periventricular and subcortical white matter to suggest chronic small vessel ischemic change. There is no acute intracranial hemorrhage, mass effect or midline shift. No abnormal extra-axial fluid collection. The gray-white differentiation is maintained without evidence of an acute infarct. There is no evidence of hydrocephalus. ORBITS: The visualized portion of the orbits demonstrate no acute abnormality. SINUSES: The visualized paranasal sinuses demonstrate no acute abnormality. Fluid identified in the mastoid air cells bilaterally. There is mucosal thickening seen throughout the ethmoid air cells and sphenoid sinuses suggesting a chronic sinusitis. SOFT TISSUES/SKULL:  No acute abnormality of the visualized skull or soft tissues. Atrophy and chronic changes seen within the brain with no acute intracranial abnormality. Fluid in the mastoid air cells bilaterally. Chronic sinusitis involving the ethmoid air cells and sphenoid sinuses bilaterally. CTA CHEST W WO CONTRAST    Result Date: 2/19/2023  EXAMINATION: CTA OF THE CHEST WITH AND WITHOUT CONTRAST 2/19/2023 4:38 am TECHNIQUE: CTA of the chest was performed before and after the administration of intravenous contrast.  Multiplanar reformatted images are provided for review. MIP images are provided for review. Automated exposure control, iterative reconstruction, and/or weight based adjustment of the mA/kV was utilized to reduce the radiation dose to as low as reasonably achievable. COMPARISON: None.  HISTORY: ORDERING SYSTEM PROVIDED HISTORY: r/o PE, PNA TECHNOLOGIST PROVIDED HISTORY: Reason for exam:->r/o PE, PNA Decision Support Exception - unselect if not a suspected or confirmed emergency medical condition->Emergency Medical Condition (MA) What reading provider will be dictating this exam?->CRC FINDINGS: Pulmonary Arteries: Pulmonary arteries are adequately opacified for evaluation. No evidence of intraluminal filling defect to suggest pulmonary embolism. Main pulmonary artery is normal in caliber. Mediastinum: Tracheostomy tube is in place. There is a feeding tube identified within the esophagus. Coronary artery calcification is identified. No pericardial effusion. No evidence of mediastinal lymphadenopathy. The heart and pericardium demonstrate no acute abnormality. There is no acute abnormality of the thoracic aorta. Lungs/pleura: Moderate bilateral pleural effusions appearing to be loculated. Septal thickening and central interstitial prominence reflecting interstitial edema and superimposed infiltrate cannot be excluded. There is a nodular infiltrate measuring 2.5 cm in the right middle lobe in which continued follow-up is recommended to exclude possible malignancy in this area. Upper Abdomen: Limited images of the upper abdomen are unremarkable. Soft Tissues/Bones: Nondisplaced left anterior 3rd through 6th rib fractures. Right anterior displaced 3rd rib fracture and nondisplaced 4th through 6th rib fractures. No evidence of pulmonary embolism. Moderate bilateral loculated pleural effusions. There is interseptal thickening and prominence suggesting interstitial edema with likely superimposed infiltrate at the lung bases. Question of a masslike density in the right middle lobe in which close interval follow-up is recommended to exclude a possible underlying lesion. Multiple anterior nondisplaced rib fracture seen bilaterally.  RECOMMENDATIONS: Unavailable     CT ABDOMEN PELVIS W IV CONTRAST Additional Contrast? None    Result Date: 2/19/2023  EXAMINATION: CT OF THE ABDOMEN AND PELVIS WITH CONTRAST 2/19/2023 4:38 am TECHNIQUE: CT of the abdomen and pelvis was performed with the administration of intravenous contrast. Multiplanar reformatted images are provided for review. Automated exposure control, iterative reconstruction, and/or weight based adjustment of the mA/kV was utilized to reduce the radiation dose to as low as reasonably achievable. COMPARISON: None. HISTORY: ORDERING SYSTEM PROVIDED HISTORY: sepsis TECHNOLOGIST PROVIDED HISTORY: Reason for exam:->sepsis Additional Contrast?->None Decision Support Exception - unselect if not a suspected or confirmed emergency medical condition->Emergency Medical Condition (MA) What reading provider will be dictating this exam?->CRC FINDINGS: Lower Chest: See the report of the chest for full details. Organs: The liver is homogeneous in appearance with mild periportal edema. Possible sludge identified in the gallbladder with no discrete stones. No intrahepatic or extrahepatic biliary ductal dilatation. The pancreas is homogeneous. The spleen is unremarkable. Both adrenal glands are within normal limits. Symmetric enhancement of the renal parenchyma. No stones or distension seen in the renal collecting system. GI/Bowel: The stomach is under distended. No wall thickening. Small bowel is within normal limits. No distension or mucosal abnormality. The colon is under distended. Wall thickening of the rectosigmoid region cannot be completely excluded. Rectal tube is in place. Pelvis: Latham catheter balloon within the bladder. Uterus is unremarkable. Peritoneum/Retroperitoneum: No abdominal retroperitoneal lymphadenopathy. Extensive atherosclerotic disease diffusely throughout the abdominal aorta and iliac vessels. Cannot exclude a moderate to high-grade stenosis of the distal abdominal aorta at the bifurcation. No pelvic adenopathy. Bones/Soft Tissues:  linear tubing or drainage catheter in the subcutaneous tissues along the left inguinal region within the pannus. Clinical correlation is needed.   The bony structures are unremarkable. Anasarca seen within the soft tissues. Rectal tube in place. Decompression identified of the bowel predominately in the recto sigmoid region in which a mild colitis cannot be excluded. Clinical correlation is needed. Drainage catheter identified within the subcutaneous tissues in the left inguinal region. Clinical correlation is needed. Extensive atherosclerotic disease involving the abdominal aorta and iliac vessels. Moderate to high-grade stenosis cannot be excluded of the distal abdominal aorta at the bifurcation. XR CHEST PORTABLE    Result Date: 2/19/2023  EXAMINATION: ONE XRAY VIEW OF THE CHEST 2/19/2023 3:27 am COMPARISON: None. HISTORY: ORDERING SYSTEM PROVIDED HISTORY: post arrest TECHNOLOGIST PROVIDED HISTORY: Reason for exam:->post arrest What reading provider will be dictating this exam?->CRC FINDINGS: Cardiac borders obscured. Moderate bilateral perihilar and interstitial lung infiltrates suggesting pulmonary edema. Small bilateral pleural effusions may be present. Minimally displaced fracture left lateral 7th rib. No pneumothorax or subcutaneous emphysema. Left PICC tip well positioned at the cavoatrial junction. Tracheostomy in good position. Gastric feeding tube appears in good position. 1. Moderate perihilar pulmonary edema and small bilateral pleural effusions. 2. Minimally displaced fracture left lateral 7th rib. No pneumothorax or subcutaneous emphysema. 3. Tracheostomy, feeding tube, and left PICC appear with good position. RECOMMENDATION: Careful clinical correlation and follow up recommended. Assessment and Plan   Cardiac arrest: Presumed hypoxic arrest.  Empirically started on targeted temperature management goal of 36 degrees. Labs every 6 hours per protocol. Echocardiogram.  Consult to cardiology. Overall very poor prognosis discussed with family hospice consulted.     Septic shock secondary to necrotizing fasciitis with loculated pleural effusion: Empirically meropenem and vancomycin. Sputum, blood cultures ordered. Urine strep and Legionella antigen. Consult to trauma surgery for possible decortication if necessary in the event family declines hospice care    Hypoxic respiratory failure: Empirically started on tidal volume 350 PEEP of 5 rate of 18 100% FiO2. Consult to pulmonary to assist with vent management. Wean as tolerated. Tracheostomy in place aggressive pulmonary hygiene and mucolytic's ordered    Elevated troponin: Likely related to cardiac arrest and resuscitation attempts: Trend cardiac enzymes for acute thrombotic event is less likely  DVT right upper extremity: Full dose Lovenox  Hypothyroid disease: IV Synthroid 100 mcg daily  Macrocytic anemia: Check B12 and folate      Approximately 60 minutes providing critical care not including any procedural intervention. Patient overall has a relatively poor prognosis this is extensively discussed with the  over the phone who has requested and agrees to hospice consultation.   Pressor support and overall supportive care will be continued until he can come see the patient and have a meeting with hospice but CODE STATUS is DNR CCA      Patient Active Problem List   Diagnosis Code    Cardiac arrest ([facility] I46.9       Arabella Dawson DO  Admitting Hospitalist    Emergency Contact:

## 2023-02-19 NOTE — FLOWSHEET NOTE
0630 Received Pt from ER on vent with 6.5 trach   Levophed gtt /NS bolus  Pupils 5 mm sluggish reactive to light. Does not follow commands. Skin Incision  sutures left inner gluteal and into left pelvic abd area with Penrose drain rectal area/  JEFRY draining cloudy light yellow drainage Rectal tube brown liquid stool  Occasional whole body jerk Spoke with Pt's  updated him would be in later today Dr. Domenico Ramirez given Phone number to call  meliplex applied to sacrum and heels skin assessed with King Goel. RN

## 2023-02-19 NOTE — CARE COORDINATION
Call to pt spouse, vm left requesting call back to gather CMI and also discuss hospice consult and give [facility] of choice.

## 2023-02-19 NOTE — CONSULTS
Inpatient consult to Cardiology  Consult performed by: Jose Flood MD  Consult ordered by: Kirti Moreno DO        Patient Name: Nury Cantu Date: 2023  3:04 AM  MR #: [id number]  : 1950    Attending Physician: Nickie Mariee MD  Reason for consult:     History of Presenting Illness:      Mary Lou Judge is a 81 y.o. female on hospital day 0 with a history of . History Obtained From:  electronic medical record    Pt was at [facility] and suffered Mucous Plug related Respiratory arrest. ROSC achieved after 4 minutes per chart. Pt recently had prolonged hjosp stay at [facility] for Necrotizing fasciitis and Panectomy. She ended up w Trache as well. No PEG du to inability to place due to large Pannus. She has been getting TF via [address]. Pt is vented via Trace on 70% FIO2. She is on Levo. She is unresponsive. Troponin Elevated. History:      EKG:   Past Medical History:   Diagnosis Date    Bladder carcinoma (HCC)     CAD (coronary artery disease)     COPD (chronic obstructive pulmonary disease) (HCC)     Diabetes mellitus ([address]     DVT (deep venous thrombosis) (HCC)     right upper ext    Simona gangrene     Hemothorax on left     Hypertension     Metabolic encephalopathy     Necrotizing fasciitis ([address]     Respiratory failure ([address]      Past Surgical History:   Procedure Laterality Date    BILATERAL VATS ABLATION      TRACHEOSTOMY         Family History  History reviewed. No pertinent family history.   [] Unable to obtain due to ventilated and/ or neurologic status    Social History     Socioeconomic History    Marital status:      Spouse name: Not on file    Number of children: Not on file    Years of education: Not on file    Highest education level: Not on file   Occupational History    Not on file   Tobacco Use    Smoking status: Not on file    Smokeless tobacco: Not on file   Substance and Sexual Activity    Alcohol use: Not on file    Drug use: Not on file    Sexual activity: Not on file   Other Topics Concern    Not on file   Social History Narrative    Not on file     Social Determinants of Health     Financial Resource Strain: Not on file   Food Insecurity: Not on file   Transportation Needs: Not on file   Physical Activity: Not on file   Stress: Not on file   Social Connections: Not on file   Intimate Partner Violence: Not on file   Housing Stability: Not on file      [] Unable to obtain due to ventilated and/ or neurologic status      Home Medications:      Medications Prior to Admission: potassium chloride (MICRO-K) 10 MEQ extended release capsule, Take 10 mEq by mouth daily  furosemide (LASIX) 10 MG/ML injection, Infuse 40 mg intravenously daily  lactulose (CEPHULAC) 20 g packet, 20 g by Per NG tube route 2 times daily  insulin glargine (LANTUS) 100 UNIT/ML injection vial, Inject 24 Units into the skin nightly  Sodium Hypochlorite (DAKINS) 0.125 % SOLN, Irrigate with as directed daily  collagenase 250 UNIT/GM ointment, Apply topically daily Apply topically daily. Multiple Vitamins-Minerals (THERAPEUTIC MULTIVITAMIN-MINERALS) tablet, 1 tablet by PEG Tube route daily  sodium zirconium cyclosilicate (LOKELMA) 5 g PACK oral suspension, 5 g in the morning and at bedtime  acetylcysteine (MUCOMYST) 20 % nebulizer solution, Inhale 70 mg/kg into the lungs 2 times daily  albuterol (ACCUNEB) 0.63 MG/3ML nebulizer solution, Take 1 ampule by nebulization every 6 hours as needed for Wheezing  melatonin 3 MG TABS tablet, Take 10 mg by mouth at bedtime  levothyroxine (SYNTHROID) 100 MCG tablet, Take 137.5 mcg by mouth Daily  ascorbic acid (VITAMIN C) 500 MG tablet, Take 500 mg by mouth daily  Heparin Sodium, Porcine, (HEPARIN, PORCINE,) 98003 UNIT/ML injection, Inject 10,000 Units into the skin in the morning and 10,000 Units at noon and 10,000 Units in the evening.   sertraline (ZOLOFT) 50 MG tablet, Take 50 mg by mouth daily  QUEtiapine (SEROQUEL) 25 MG tablet, Take 50 mg by mouth daily  QUEtiapine (SEROQUEL) 100 MG tablet, Take 100 mg by mouth at bedtime  omeprazole (PRILOSEC) 20 MG delayed release capsule, Take 40 mg by mouth daily  insulin lispro (HUMALOG) 100 UNIT/ML injection vial, Inject 5 Units into the skin 3 times daily (before meals)  nystatin (MYCOSTATIN) POWD powder, Apply topically 2 times daily  metoprolol tartrate (LOPRESSOR) 25 MG tablet, Take 12.5 mg by mouth 2 times daily  metOLazone (ZAROXOLYN) 5 MG tablet, Take 5 mg by mouth daily  meropenem (MERREM) 1 g injection, Infuse 1,000 mg intravenously in the morning and 1,000 mg at noon and 1,000 mg in the evening. atorvastatin (LIPITOR) 80 MG tablet, Take 80 mg by mouth at bedtime  acetaminophen (TYLENOL) 160 MG/5ML liquid, Take 15 mg/kg by mouth every 6 hours as needed for Fever    Current Hospital Medications:     Scheduled Meds:   chlorhexidine  15 mL Mouth/Throat BID    famotidine (PEPCID) injection  20 mg IntraVENous BID    insulin lispro  0-4 Units SubCUTAneous TID WC    insulin lispro  0-4 Units SubCUTAneous Nightly    Levothyroxine Sodium  100 mcg IntraVENous Daily    meropenem  1,000 mg IntraVENous Q8H    insulin glargine  24 Units SubCUTAneous Nightly    enoxaparin  1 mg/kg SubCUTAneous BID    vancomycin  1,000 mg IntraVENous Q12H     Continuous Infusions:   dextrose      norepinephrine 8 mcg/min (02/19/23 0800)    propofol 20 mcg/kg/min (02/19/23 1058)     PRN Meds:.magnesium sulfate, glucose, dextrose bolus **OR** dextrose bolus, glucagon (rDNA), dextrose, ondansetron **OR** ondansetron  .    dextrose      norepinephrine 8 mcg/min (02/19/23 0800)    propofol 20 mcg/kg/min (02/19/23 1058)        Allergies:     No Known Allergies     Review of Systems:       Review of Systems  Vented unresponsive    Objective Findings:     Vitals:BP (!) 165/83   Pulse 74   Temp 97 °F (36.1 °C) (Rectal)   Resp 24   Ht 5' 5\" (1.651 m)   Wt 215 lb 6.2 oz (97.7 kg)   SpO2 100%   BMI 35.84 kg/m²      Physical Examination:    Physical Exam   Constitutional: She appears chronically ill and acutely ill. HENT:   Normal cephalic and Atraumatic   Eyes: Pupils are equal, round, and reactive to light. Neck: Thyroid normal. No JVD present. No neck adenopathy. No thyromegaly present. Cardiovascular: Normal rate, regular rhythm, normal heart sounds, intact distal pulses and normal pulses. Pulmonary/Chest: Effort normal and breath sounds normal. She has no wheezes. She has no rales. She exhibits no tenderness. Abdominal: Soft. Bowel sounds are normal. There is no abdominal tenderness. Musculoskeletal:         General: No tenderness or edema. Normal range of motion. Cervical back: Normal range of motion and neck supple. Neurological: She exhibits altered mental status and a cognitive deficit. Skin: Skin is warm. No cyanosis. Nails show no clubbing.        Results/ Medications reviewed 2/19/2023, 11:14 AM     Laboratory, Microbiology, Pathology, Radiology, Cardiology, Medications and Transcriptions reviewed  Scheduled Meds:   chlorhexidine  15 mL Mouth/Throat BID    famotidine (PEPCID) injection  20 mg IntraVENous BID    insulin lispro  0-4 Units SubCUTAneous TID WC    insulin lispro  0-4 Units SubCUTAneous Nightly    Levothyroxine Sodium  100 mcg IntraVENous Daily    meropenem  1,000 mg IntraVENous Q8H    insulin glargine  24 Units SubCUTAneous Nightly    enoxaparin  1 mg/kg SubCUTAneous BID    vancomycin  1,000 mg IntraVENous Q12H     Continuous Infusions:   dextrose      norepinephrine 8 mcg/min (02/19/23 0800)    propofol 20 mcg/kg/min (02/19/23 1058)       Recent Labs     02/19/23 0329 02/19/23  0330   WBC  --  36.4*   HGB 7.2* 7.9*   HCT  --  27.4*   MCV  --  97.0*   PLT  --  371     Recent Labs     02/19/23  0329 02/19/23  0330 02/19/23  0804   NA  --  142 139   K  --  4.8 5.1*   CL  --  98 100   CO2  --  29 30   PHOS  --   --  4.5   BUN  --  30* 32*   CREATININE 0.9 0.81 0.65     Recent Labs     02/19/23  0330   AST 95*   ALT 64* BILITOT <0.2   ALKPHOS 243*     No results for input(s): LIPASE, AMYLASE in the last 72 hours. Recent Labs     02/19/23  0330 02/19/23  0343 02/19/23  0923   PROT 5.3*  --   --    INR  --  1.3 1.2     CT HEAD WO CONTRAST    Result Date: 2/19/2023  EXAMINATION: CT OF THE HEAD WITHOUT CONTRAST  2/19/2023 4:38 am TECHNIQUE: CT of the head was performed without the administration of intravenous contrast. Automated exposure control, iterative reconstruction, and/or weight based adjustment of the mA/kV was utilized to reduce the radiation dose to as low as reasonably achievable. COMPARISON: None. HISTORY: ORDERING SYSTEM PROVIDED HISTORY: AMS TECHNOLOGIST PROVIDED HISTORY: Reason for exam:->AMS Has a \"code stroke\" or \"stroke alert\" been called? ->No Decision Support Exception - unselect if not a suspected or confirmed emergency medical condition->Emergency Medical Condition (MA) What reading provider will be dictating this exam?->CRC FINDINGS: BRAIN/VENTRICLES: Generalized atrophy identified of the brain with low-attenuation areas seen in the periventricular and subcortical white matter to suggest chronic small vessel ischemic change. There is no acute intracranial hemorrhage, mass effect or midline shift. No abnormal extra-axial fluid collection. The gray-white differentiation is maintained without evidence of an acute infarct. There is no evidence of hydrocephalus. ORBITS: The visualized portion of the orbits demonstrate no acute abnormality. SINUSES: The visualized paranasal sinuses demonstrate no acute abnormality. Fluid identified in the mastoid air cells bilaterally. There is mucosal thickening seen throughout the ethmoid air cells and sphenoid sinuses suggesting a chronic sinusitis. SOFT TISSUES/SKULL:  No acute abnormality of the visualized skull or soft tissues. Atrophy and chronic changes seen within the brain with no acute intracranial abnormality. Fluid in the mastoid air cells bilaterally.   Chronic sinusitis involving the ethmoid air cells and sphenoid sinuses bilaterally. CTA CHEST W WO CONTRAST    Result Date: 2/19/2023  EXAMINATION: CTA OF THE CHEST WITH AND WITHOUT CONTRAST 2/19/2023 4:38 am TECHNIQUE: CTA of the chest was performed before and after the administration of intravenous contrast.  Multiplanar reformatted images are provided for review. MIP images are provided for review. Automated exposure control, iterative reconstruction, and/or weight based adjustment of the mA/kV was utilized to reduce the radiation dose to as low as reasonably achievable. COMPARISON: None. HISTORY: ORDERING SYSTEM PROVIDED HISTORY: r/o PE, PNA TECHNOLOGIST PROVIDED HISTORY: Reason for exam:->r/o PE, PNA Decision Support Exception - unselect if not a suspected or confirmed emergency medical condition->Emergency Medical Condition (MA) What reading provider will be dictating this exam?->CRC FINDINGS: Pulmonary Arteries: Pulmonary arteries are adequately opacified for evaluation. No evidence of intraluminal filling defect to suggest pulmonary embolism. Main pulmonary artery is normal in caliber. Mediastinum: Tracheostomy tube is in place. There is a feeding tube identified within the esophagus. Coronary artery calcification is identified. No pericardial effusion. No evidence of mediastinal lymphadenopathy. The heart and pericardium demonstrate no acute abnormality. There is no acute abnormality of the thoracic aorta. Lungs/pleura: Moderate bilateral pleural effusions appearing to be loculated. Septal thickening and central interstitial prominence reflecting interstitial edema and superimposed infiltrate cannot be excluded. There is a nodular infiltrate measuring 2.5 cm in the right middle lobe in which continued follow-up is recommended to exclude possible malignancy in this area. Upper Abdomen: Limited images of the upper abdomen are unremarkable.  Soft Tissues/Bones: Nondisplaced left anterior 3rd through 6th rib fractures. Right anterior displaced 3rd rib fracture and nondisplaced 4th through 6th rib fractures. No evidence of pulmonary embolism. Moderate bilateral loculated pleural effusions. There is interseptal thickening and prominence suggesting interstitial edema with likely superimposed infiltrate at the lung bases. Question of a masslike density in the right middle lobe in which close interval follow-up is recommended to exclude a possible underlying lesion. Multiple anterior nondisplaced rib fracture seen bilaterally. RECOMMENDATIONS: Unavailable     CT ABDOMEN PELVIS W IV CONTRAST Additional Contrast? None    Result Date: 2/19/2023  EXAMINATION: CT OF THE ABDOMEN AND PELVIS WITH CONTRAST 2/19/2023 4:38 am TECHNIQUE: CT of the abdomen and pelvis was performed with the administration of intravenous contrast. Multiplanar reformatted images are provided for review. Automated exposure control, iterative reconstruction, and/or weight based adjustment of the mA/kV was utilized to reduce the radiation dose to as low as reasonably achievable. COMPARISON: None. HISTORY: ORDERING SYSTEM PROVIDED HISTORY: sepsis TECHNOLOGIST PROVIDED HISTORY: Reason for exam:->sepsis Additional Contrast?->None Decision Support Exception - unselect if not a suspected or confirmed emergency medical condition->Emergency Medical Condition (MA) What reading provider will be dictating this exam?->CRC FINDINGS: Lower Chest: See the report of the chest for full details. Organs: The liver is homogeneous in appearance with mild periportal edema. Possible sludge identified in the gallbladder with no discrete stones. No intrahepatic or extrahepatic biliary ductal dilatation. The pancreas is homogeneous. The spleen is unremarkable. Both adrenal glands are within normal limits. Symmetric enhancement of the renal parenchyma. No stones or distension seen in the renal collecting system. GI/Bowel: The stomach is under distended.   No wall thickening. Small bowel is within normal limits. No distension or mucosal abnormality. The colon is under distended. Wall thickening of the rectosigmoid region cannot be completely excluded. Rectal tube is in place. Pelvis: Latham catheter balloon within the bladder. Uterus is unremarkable. Peritoneum/Retroperitoneum: No abdominal retroperitoneal lymphadenopathy. Extensive atherosclerotic disease diffusely throughout the abdominal aorta and iliac vessels. Cannot exclude a moderate to high-grade stenosis of the distal abdominal aorta at the bifurcation. No pelvic adenopathy. Bones/Soft Tissues:  linear tubing or drainage catheter in the subcutaneous tissues along the left inguinal region within the pannus. Clinical correlation is needed. The bony structures are unremarkable. Anasarca seen within the soft tissues. Rectal tube in place. Decompression identified of the bowel predominately in the recto sigmoid region in which a mild colitis cannot be excluded. Clinical correlation is needed. Drainage catheter identified within the subcutaneous tissues in the left inguinal region. Clinical correlation is needed. Extensive atherosclerotic disease involving the abdominal aorta and iliac vessels. Moderate to high-grade stenosis cannot be excluded of the distal abdominal aorta at the bifurcation. XR CHEST PORTABLE    Result Date: 2/19/2023  EXAMINATION: ONE XRAY VIEW OF THE CHEST 2/19/2023 3:27 am COMPARISON: None. HISTORY: ORDERING SYSTEM PROVIDED HISTORY: post arrest TECHNOLOGIST PROVIDED HISTORY: Reason for exam:->post arrest What reading provider will be dictating this exam?->CRC FINDINGS: Cardiac borders obscured. Moderate bilateral perihilar and interstitial lung infiltrates suggesting pulmonary edema. Small bilateral pleural effusions may be present. Minimally displaced fracture left lateral 7th rib. No pneumothorax or subcutaneous emphysema.   Left PICC tip well positioned at the cavoatrial junction. Tracheostomy in good position. Gastric feeding tube appears in good position. 1. Moderate perihilar pulmonary edema and small bilateral pleural effusions. 2. Minimally displaced fracture left lateral 7th rib. No pneumothorax or subcutaneous emphysema. 3. Tracheostomy, feeding tube, and left PICC appear with good position. RECOMMENDATION: Careful clinical correlation and follow up recommended. Active Hospital Problems    Diagnosis Date Noted    Cardiopulmonary arrest [facility]) [I46.9] 02/19/2023     Priority: Medium         Impression/Plan:   CPA due to Mucous plugging and and Hypoxic respiratory failure. - ROSC post 4 minute. Pupils are dilated and sluggish. ICU care  Chronic respiratory failure vented via Trache  Morbid Obesity  Likely RON  NSTEMI- ASA. We moy start CV protective meds when stable and off Pressors. Need Echo      Thank you for allowing us to participate in the care of this patient. Will continue to follow. Please call if questions or concerns arise.     Electronically signed by Radha Cardona MD on 2/19/2023 at 11:14 AM

## 2023-02-20 PROBLEM — J96.22 ACUTE ON CHRONIC RESPIRATORY FAILURE WITH HYPOXIA AND HYPERCAPNIA (HCC): Status: ACTIVE | Noted: 2023-01-01

## 2023-02-20 PROBLEM — J96.21 ACUTE ON CHRONIC RESPIRATORY FAILURE WITH HYPOXIA AND HYPERCAPNIA (HCC): Status: ACTIVE | Noted: 2023-01-01

## 2023-02-20 NOTE — PROGRESS NOTES
Critical Care Progress Note    2023 7:36 AM    Subjective:   Admit Date: 2023  PCP: No primary care provider on file. Chief Complaint   Patient presents with    Respiratory Arrest     Post resp arrest at [facility] nursing home, arrested at 0222     Interval History: On mechanical ventilation at low ventilator settings. Sedated with propofol. Not requiring vasopressors. Urine output is 2400 cc overnight. No fever overnight. Medications:   Scheduled Meds:   chlorhexidine  15 mL Mouth/Throat BID    famotidine (PEPCID) injection  20 mg IntraVENous BID    insulin lispro  0-4 Units SubCUTAneous TID WC    insulin lispro  0-4 Units SubCUTAneous Nightly    Levothyroxine Sodium  100 mcg IntraVENous Daily    meropenem  1,000 mg IntraVENous Q8H    insulin glargine  24 Units SubCUTAneous Nightly    enoxaparin  1 mg/kg SubCUTAneous BID    vancomycin  1,000 mg IntraVENous Q12H    aspirin  81 mg Oral Daily     Continuous Infusions:   dextrose      norepinephrine Stopped (23 1147)    propofol 25 mcg/kg/min (23 0645)         Objective:   Vitals:   Temp (24hrs), Av.7 °F (35.9 °C), Min:95.9 °F (35.5 °C), Max:97.3 °F (36.3 °C)    /64   Pulse 75   Temp 97.3 °F (36.3 °C)   Resp 25   Ht 5' 5\" (1.651 m)   Wt 215 lb 6.2 oz (97.7 kg)   SpO2 100%   BMI 35.84 kg/m²   I/O:24HR INTAKE/OUTPUT:    Intake/Output Summary (Last 24 hours) at 2023 0736  Last data filed at 2023 7226  Gross per 24 hour   Intake 1391.9 ml   Output 2400 ml   Net -1008.1 ml      0701 -  0700  In: 1391.9 [I.V.:317.5]  Out: 2400 [Urine:2000; Drains:100]  CVP:           Ventilator Settings:  Vent Mode: AC/VC  Resp Rate (Set): 18 bmp   Vt (Set, mL): 350 mL       PEEP/CPAP (cmH2O): 5   FiO2 : 40 %     Physical Exam:  General appearance -ill-appearing  Mental status -sedated.   Has tracheostomy  Eyes - pupils equal and reactive   Neck -tracheostomy in place, connected to vent  Chest - clear to auscultation, no wheezes, rales or rhonchi   Heart - normal rate, regular rhythm, normal S1, S2, no murmurs  Abdomen -PEG tube in place, diminished bowel sounds. Abdominal dressing is in place. Rectal - deferred, not clinically indicated  Neurological -sedated, minimally responsive. BMP:    Recent Labs     02/19/23  1806 02/20/23  0000 02/20/23  0433 02/20/23  0600    145*  --  143   K 4.2 4.3  --  4.2    102  --  101   CO2 32* 33*  --  32*   BUN 29* 27*  --  26*   CREATININE 0.54 0.59 0.5* 0.55   GLUCOSE 142* 143*  --  139*    . MG:3,PHOS:3)@  Ionized Calcium: No results found for: IONCA  CBC:   Recent Labs     02/19/23  0330 02/20/23  0433 02/20/23  0600   WBC 36.4*  --  21.7*   HGB 7.9* 12.2 7.6*     --  314      ABG: No results for input(s): PH, PCO2, PO2 in the last 72 hours. Assessment and Plan:     Impression:    -CP arrest due to hypoxic and hypercapnic respiratory failure.  -Acute on chronic hypoxic and hypercapnic respiratory failure. -Excessive respiratory secretions and mucous plugs.  -Bilateral pneumonia. -Bilateral loculated pleural effusion.  -Shock was requiring vasopressors. This has resolved. -Necrotizing fasciitis status post extensive surgery and debridement. Recommendations:  -Continue current care in the ICU for hemodynamic and neuro monitoring.  -Continue ventilator bundle.  -Observe off of vasopressors.  -Continue IV antibiotics. -Dressing changes. -Replace electrolytes. -Strict intake and output measurement. Watch kidney function closely.  -Tight glucose control. Patient prognosis is very poor. He is DNR CCA. She is hospice appropriate. Await 's decision.         Prophylaxis:  Stress ulcer: [] PPI Agent [] H2RA [] Sucralfate [] Other:   VTE: [] Enoxaparin  [] SC Heparin  [] SCD      DNR-CCA      Excluding procedures, the total critical care time caring for this patient with life threatening, unstable organ failure, including direct patient contact, review of medical record, management of life support systems, review of data including imaging and labs, discussions with other team members, patient's family and physicians at least 31 minutes so far today.         Electronically signed by Devonte Flores MD on 2/20/2023 at 7:36 AM

## 2023-02-20 NOTE — CARE COORDINATION
I called Spouse Aaron Yeung and he is agreeable to [facility] hospice with [facility]. He states pt came to us from [facility]. He is waiting for hospice to contact him and his ride from [facility] to bring him here for that. I let him know they will call him after I call them. I called Jacoby Soriano and I gave info to Saúl from [facility] who will give to hospice staff/manager. She will let them know the spouse is awaiting call. Chad Nunez the bedside nurse know that the spouse is still agreeable and that he answered the phone with 1st call to him. I also gave spouse the number to the ICU and let him know Anne-Marie is the nurse for his wife.

## 2023-02-20 NOTE — FLOWSHEET NOTE
Unresponsive on vent.   Propofol  25 mcg infusing for  seizures activity.  Corpak  flushed and patent.  FMS small amt brown stool.  Latham draining clear yellow urine.  Vitals stable

## 2023-02-20 NOTE — CONSULTS
[address] [facility]  referral completed. Consents signed by pt's spouse. Plan is for pt to transfer to hospice center tonight with with [facility] tomorrow at noon at hospice center. Transportation to be arranged for move today. Will need Signed [facility] for ambulance transport.

## 2023-02-20 NOTE — CARE COORDINATION
Quality round completed with care management team. No family at bedside. Pt is from [facility]. Hospice consult in. LSW tried to call pt's . Did not answer. LSW will follow.      Electronically signed by MEGHANA Hood on 2/20/2023 at 10:32 AM

## 2023-02-20 NOTE — PROGRESS NOTES
PROGRESS NOTE    Patient Name: Enriqueta Townsend Date: 2023  3:04 AM  MR #: [id number]  : 1950    Attending Physician: Phyllis Garcia MD  Reason for consult:     History of Presenting Illness:      Christelle Wilkins is a 81 y.o. female on hospital day 1 with a history of . History Obtained From:  electronic medical record    Pt was at [facility] and suffered Mucous Plug related Respiratory arrest. ROSC achieved after 4 minutes per chart. Pt recently had prolonged hjosp stay at [facility] for Necrotizing fasciitis and Panectomy. She ended up w Trache as well. No PEG du to inability to place due to large Pannus. She has been getting TF via [address]. Pt is vented via Trace on 70% FIO2. She is on Levo. She is unresponsive. Troponin Elevated. 23 pt remains vented vis trach. Unresponsive. Not cooughing. No gag. Off Levo  Receiving TF. History:      EKG:   Past Medical History:   Diagnosis Date    Bladder carcinoma (HCC)     CAD (coronary artery disease)     COPD (chronic obstructive pulmonary disease) (HCC)     Diabetes mellitus ([address]     DVT (deep venous thrombosis) (HCC)     right upper ext    Simona gangrene     Hemothorax on left     Hypertension     Metabolic encephalopathy     Necrotizing fasciitis ([address]     Respiratory failure ([address]      Past Surgical History:   Procedure Laterality Date    BILATERAL VATS ABLATION      TRACHEOSTOMY         Family History  History reviewed. No pertinent family history.   [] Unable to obtain due to ventilated and/ or neurologic status    Social History     Socioeconomic History    Marital status:      Spouse name: Not on file    Number of children: Not on file    Years of education: Not on file    Highest education level: Not on file   Occupational History    Not on file   Tobacco Use    Smoking status: Not on file    Smokeless tobacco: Not on file   Substance and Sexual Activity    Alcohol use: Not on file    Drug use: Not on file    Sexual activity: Not on file   Other Topics Concern    Not on file   Social History Narrative    Not on file     Social Determinants of Health     Financial Resource Strain: Not on file   Food Insecurity: Not on file   Transportation Needs: Not on file   Physical Activity: Not on file   Stress: Not on file   Social Connections: Not on file   Intimate Partner Violence: Not on file   Housing Stability: Not on file      [] Unable to obtain due to ventilated and/ or neurologic status      Home Medications:      Medications Prior to Admission: potassium chloride (MICRO-K) 10 MEQ extended release capsule, Take 10 mEq by mouth daily  furosemide (LASIX) 10 MG/ML injection, Infuse 40 mg intravenously daily  lactulose (CEPHULAC) 20 g packet, 20 g by Per NG tube route 2 times daily  insulin glargine (LANTUS) 100 UNIT/ML injection vial, Inject 24 Units into the skin nightly  Sodium Hypochlorite (DAKINS) 0.125 % SOLN, Irrigate with as directed daily  collagenase 250 UNIT/GM ointment, Apply topically daily Apply topically daily. Multiple Vitamins-Minerals (THERAPEUTIC MULTIVITAMIN-MINERALS) tablet, 1 tablet by PEG Tube route daily  sodium zirconium cyclosilicate (LOKELMA) 5 g PACK oral suspension, 5 g in the morning and at bedtime  acetylcysteine (MUCOMYST) 20 % nebulizer solution, Inhale 70 mg/kg into the lungs 2 times daily  albuterol (ACCUNEB) 0.63 MG/3ML nebulizer solution, Take 1 ampule by nebulization every 6 hours as needed for Wheezing  melatonin 3 MG TABS tablet, Take 10 mg by mouth at bedtime  levothyroxine (SYNTHROID) 100 MCG tablet, Take 137.5 mcg by mouth Daily  ascorbic acid (VITAMIN C) 500 MG tablet, Take 500 mg by mouth daily  Heparin Sodium, Porcine, (HEPARIN, PORCINE,) 61862 UNIT/ML injection, Inject 10,000 Units into the skin in the morning and 10,000 Units at noon and 10,000 Units in the evening.   sertraline (ZOLOFT) 50 MG tablet, Take 50 mg by mouth daily  QUEtiapine (SEROQUEL) 25 MG tablet, Take 50 mg by mouth daily  QUEtiapine (SEROQUEL) 100 MG tablet, Take 100 mg by mouth at bedtime  omeprazole (PRILOSEC) 20 MG delayed release capsule, Take 40 mg by mouth daily  insulin lispro (HUMALOG) 100 UNIT/ML injection vial, Inject 5 Units into the skin 3 times daily (before meals)  nystatin (MYCOSTATIN) POWD powder, Apply topically 2 times daily  metoprolol tartrate (LOPRESSOR) 25 MG tablet, Take 12.5 mg by mouth 2 times daily  metOLazone (ZAROXOLYN) 5 MG tablet, Take 5 mg by mouth daily  meropenem (MERREM) 1 g injection, Infuse 1,000 mg intravenously in the morning and 1,000 mg at noon and 1,000 mg in the evening. atorvastatin (LIPITOR) 80 MG tablet, Take 80 mg by mouth at bedtime  acetaminophen (TYLENOL) 160 MG/5ML liquid, Take 15 mg/kg by mouth every 6 hours as needed for Fever    Current Hospital Medications:     Scheduled Meds:   chlorhexidine  15 mL Mouth/Throat BID    famotidine (PEPCID) injection  20 mg IntraVENous BID    insulin lispro  0-4 Units SubCUTAneous TID WC    insulin lispro  0-4 Units SubCUTAneous Nightly    Levothyroxine Sodium  100 mcg IntraVENous Daily    meropenem  1,000 mg IntraVENous Q8H    insulin glargine  24 Units SubCUTAneous Nightly    enoxaparin  1 mg/kg SubCUTAneous BID    vancomycin  1,000 mg IntraVENous Q12H    aspirin  81 mg Oral Daily     Continuous Infusions:   dextrose      norepinephrine Stopped (02/19/23 1147)    propofol 25 mcg/kg/min (02/20/23 0645)     PRN Meds:.magnesium sulfate, glucose, dextrose bolus **OR** dextrose bolus, glucagon (rDNA), dextrose, ondansetron **OR** ondansetron  .    dextrose      norepinephrine Stopped (02/19/23 1147)    propofol 25 mcg/kg/min (02/20/23 0645)        Allergies:     No Known Allergies     Review of Systems:       Review of Systems  Vented unresponsive    Objective Findings:     Vitals:/64   Pulse 85   Temp 97.3 °F (36.3 °C)   Resp 23   Ht 5' 5\" (1.651 m)   Wt 215 lb 6.2 oz (97.7 kg)   SpO2 99%   BMI 35.84 kg/m²      Physical Examination:    Physical Exam   Constitutional: She appears chronically ill and acutely ill. HENT:   Normal cephalic and Atraumatic   Eyes: Pupils are equal, round, and reactive to light. Neck: Thyroid normal. No JVD present. No neck adenopathy. No thyromegaly present. Cardiovascular: Normal rate, regular rhythm, intact distal pulses and normal pulses. Murmur heard. Pulmonary/Chest: Effort normal and breath sounds normal. She has no wheezes. She has no rales. She exhibits no tenderness. Abdominal: Soft. Bowel sounds are normal. There is no abdominal tenderness. Musculoskeletal:         General: No tenderness or edema. Normal range of motion. Cervical back: Normal range of motion and neck supple. Neurological: She exhibits altered mental status and a cognitive deficit. Skin: Skin is warm. No cyanosis. Nails show no clubbing.        Results/ Medications reviewed 2/20/2023, 11:16 AM     Laboratory, Microbiology, Pathology, Radiology, Cardiology, Medications and Transcriptions reviewed  Scheduled Meds:   chlorhexidine  15 mL Mouth/Throat BID    famotidine (PEPCID) injection  20 mg IntraVENous BID    insulin lispro  0-4 Units SubCUTAneous TID WC    insulin lispro  0-4 Units SubCUTAneous Nightly    Levothyroxine Sodium  100 mcg IntraVENous Daily    meropenem  1,000 mg IntraVENous Q8H    insulin glargine  24 Units SubCUTAneous Nightly    enoxaparin  1 mg/kg SubCUTAneous BID    vancomycin  1,000 mg IntraVENous Q12H    aspirin  81 mg Oral Daily     Continuous Infusions:   dextrose      norepinephrine Stopped (02/19/23 1147)    propofol 25 mcg/kg/min (02/20/23 0645)       Recent Labs     02/19/23  0330 02/20/23  0433 02/20/23  0600   WBC 36.4*  --  21.7*   HGB 7.9* 12.2 7.6*   HCT 27.4*  --  25.0*   MCV 97.0*  --  95.2*     --  314       Recent Labs     02/19/23  1806 02/20/23  0000 02/20/23  0433 02/20/23  0600    145*  --  143   K 4.2 4.3  --  4.2    102  --  101   CO2 32* 33*  -- 32*   PHOS 4.0 3.5  --  3.1   BUN 29* 27*  --  26*   CREATININE 0.54 0.59 0.5* 0.55       Recent Labs     02/19/23  0330 02/20/23  0600   AST 95* 27   ALT 64* 40*   BILIDIR  --  <0.2   BILITOT <0.2 0.3   ALKPHOS 243* 202*       No results for input(s): LIPASE, AMYLASE in the last 72 hours. Recent Labs     02/19/23  0330 02/19/23  0343 02/19/23  0923 02/20/23  0600   PROT 5.3*  --   --  5.2*   INR  --  1.3 1.2  --        CT HEAD WO CONTRAST    Result Date: 2/19/2023  EXAMINATION: CT OF THE HEAD WITHOUT CONTRAST  2/19/2023 4:38 am TECHNIQUE: CT of the head was performed without the administration of intravenous contrast. Automated exposure control, iterative reconstruction, and/or weight based adjustment of the mA/kV was utilized to reduce the radiation dose to as low as reasonably achievable. COMPARISON: None. HISTORY: ORDERING SYSTEM PROVIDED HISTORY: AMS TECHNOLOGIST PROVIDED HISTORY: Reason for exam:->AMS Has a \"code stroke\" or \"stroke alert\" been called? ->No Decision Support Exception - unselect if not a suspected or confirmed emergency medical condition->Emergency Medical Condition (MA) What reading provider will be dictating this exam?->CRC FINDINGS: BRAIN/VENTRICLES: Generalized atrophy identified of the brain with low-attenuation areas seen in the periventricular and subcortical white matter to suggest chronic small vessel ischemic change. There is no acute intracranial hemorrhage, mass effect or midline shift. No abnormal extra-axial fluid collection. The gray-white differentiation is maintained without evidence of an acute infarct. There is no evidence of hydrocephalus. ORBITS: The visualized portion of the orbits demonstrate no acute abnormality. SINUSES: The visualized paranasal sinuses demonstrate no acute abnormality. Fluid identified in the mastoid air cells bilaterally. There is mucosal thickening seen throughout the ethmoid air cells and sphenoid sinuses suggesting a chronic sinusitis.  SOFT TISSUES/SKULL:  No acute abnormality of the visualized skull or soft tissues. Atrophy and chronic changes seen within the brain with no acute intracranial abnormality. Fluid in the mastoid air cells bilaterally. Chronic sinusitis involving the ethmoid air cells and sphenoid sinuses bilaterally. CTA CHEST W WO CONTRAST    Result Date: 2/19/2023  EXAMINATION: CTA OF THE CHEST WITH AND WITHOUT CONTRAST 2/19/2023 4:38 am TECHNIQUE: CTA of the chest was performed before and after the administration of intravenous contrast.  Multiplanar reformatted images are provided for review. MIP images are provided for review. Automated exposure control, iterative reconstruction, and/or weight based adjustment of the mA/kV was utilized to reduce the radiation dose to as low as reasonably achievable. COMPARISON: None. HISTORY: ORDERING SYSTEM PROVIDED HISTORY: r/o PE, PNA TECHNOLOGIST PROVIDED HISTORY: Reason for exam:->r/o PE, PNA Decision Support Exception - unselect if not a suspected or confirmed emergency medical condition->Emergency Medical Condition (MA) What reading provider will be dictating this exam?->CRC FINDINGS: Pulmonary Arteries: Pulmonary arteries are adequately opacified for evaluation. No evidence of intraluminal filling defect to suggest pulmonary embolism. Main pulmonary artery is normal in caliber. Mediastinum: Tracheostomy tube is in place. There is a feeding tube identified within the esophagus. Coronary artery calcification is identified. No pericardial effusion. No evidence of mediastinal lymphadenopathy. The heart and pericardium demonstrate no acute abnormality. There is no acute abnormality of the thoracic aorta. Lungs/pleura: Moderate bilateral pleural effusions appearing to be loculated. Septal thickening and central interstitial prominence reflecting interstitial edema and superimposed infiltrate cannot be excluded.   There is a nodular infiltrate measuring 2.5 cm in the right middle lobe in which continued follow-up is recommended to exclude possible malignancy in this area. Upper Abdomen: Limited images of the upper abdomen are unremarkable. Soft Tissues/Bones: Nondisplaced left anterior 3rd through 6th rib fractures. Right anterior displaced 3rd rib fracture and nondisplaced 4th through 6th rib fractures. No evidence of pulmonary embolism. Moderate bilateral loculated pleural effusions. There is interseptal thickening and prominence suggesting interstitial edema with likely superimposed infiltrate at the lung bases. Question of a masslike density in the right middle lobe in which close interval follow-up is recommended to exclude a possible underlying lesion. Multiple anterior nondisplaced rib fracture seen bilaterally. RECOMMENDATIONS: Unavailable     CT ABDOMEN PELVIS W IV CONTRAST Additional Contrast? None    Result Date: 2/19/2023  EXAMINATION: CT OF THE ABDOMEN AND PELVIS WITH CONTRAST 2/19/2023 4:38 am TECHNIQUE: CT of the abdomen and pelvis was performed with the administration of intravenous contrast. Multiplanar reformatted images are provided for review. Automated exposure control, iterative reconstruction, and/or weight based adjustment of the mA/kV was utilized to reduce the radiation dose to as low as reasonably achievable. COMPARISON: None. HISTORY: ORDERING SYSTEM PROVIDED HISTORY: sepsis TECHNOLOGIST PROVIDED HISTORY: Reason for exam:->sepsis Additional Contrast?->None Decision Support Exception - unselect if not a suspected or confirmed emergency medical condition->Emergency Medical Condition (MA) What reading provider will be dictating this exam?->CRC FINDINGS: Lower Chest: See the report of the chest for full details. Organs: The liver is homogeneous in appearance with mild periportal edema. Possible sludge identified in the gallbladder with no discrete stones. No intrahepatic or extrahepatic biliary ductal dilatation. The pancreas is homogeneous.   The spleen is unremarkable. Both adrenal glands are within normal limits. Symmetric enhancement of the renal parenchyma. No stones or distension seen in the renal collecting system. GI/Bowel: The stomach is under distended. No wall thickening. Small bowel is within normal limits. No distension or mucosal abnormality. The colon is under distended. Wall thickening of the rectosigmoid region cannot be completely excluded. Rectal tube is in place. Pelvis: Latham catheter balloon within the bladder. Uterus is unremarkable. Peritoneum/Retroperitoneum: No abdominal retroperitoneal lymphadenopathy. Extensive atherosclerotic disease diffusely throughout the abdominal aorta and iliac vessels. Cannot exclude a moderate to high-grade stenosis of the distal abdominal aorta at the bifurcation. No pelvic adenopathy. Bones/Soft Tissues:  linear tubing or drainage catheter in the subcutaneous tissues along the left inguinal region within the pannus. Clinical correlation is needed. The bony structures are unremarkable. Anasarca seen within the soft tissues. Rectal tube in place. Decompression identified of the bowel predominately in the recto sigmoid region in which a mild colitis cannot be excluded. Clinical correlation is needed. Drainage catheter identified within the subcutaneous tissues in the left inguinal region. Clinical correlation is needed. Extensive atherosclerotic disease involving the abdominal aorta and iliac vessels. Moderate to high-grade stenosis cannot be excluded of the distal abdominal aorta at the bifurcation. XR CHEST PORTABLE    Result Date: 2/19/2023  EXAMINATION: ONE XRAY VIEW OF THE CHEST 2/19/2023 3:27 am COMPARISON: None. HISTORY: ORDERING SYSTEM PROVIDED HISTORY: post arrest TECHNOLOGIST PROVIDED HISTORY: Reason for exam:->post arrest What reading provider will be dictating this exam?->CRC FINDINGS: Cardiac borders obscured.   Moderate bilateral perihilar and interstitial lung infiltrates suggesting pulmonary edema. Small bilateral pleural effusions may be present. Minimally displaced fracture left lateral 7th rib. No pneumothorax or subcutaneous emphysema. Left PICC tip well positioned at the cavoatrial junction. Tracheostomy in good position. Gastric feeding tube appears in good position. 1. Moderate perihilar pulmonary edema and small bilateral pleural effusions. 2. Minimally displaced fracture left lateral 7th rib. No pneumothorax or subcutaneous emphysema. 3. Tracheostomy, feeding tube, and left PICC appear with good position. RECOMMENDATION: Careful clinical correlation and follow up recommended. Active Hospital Problems    Diagnosis Date Noted    Cardiopulmonary arrest [facility]) [I46.9] 02/19/2023     Priority: Medium         Impression/Plan:   CPA due to Mucous plugging and and Hypoxic respiratory failure. - ROSC post 4 minute. Pupils are dilated and sluggish. ICU care. Remains unresponsive  Chronic respiratory failure vented via Trache  Morbid Obesity  Likely RON  NSTEMI- ASA. We moy start CV protective meds when stable and off Pressors. Echo severe Mitral Stenosis  Moderate AS  Moderate severe PA HTN  Moderate MR  Supportive care. Apparently family wants Hospice care involved. Discussed w Rn. Thank you for allowing us to participate in the care of this patient. Will continue to follow. Please call if questions or concerns arise.     Electronically signed by Greg Multani MD on 2/20/2023 at 11:16 AM

## 2023-02-20 NOTE — PROGRESS NOTES
0700am Report from [facility]. Unresponsive. Trach with vent. Noa and [address] Corpak clamped. Pupils dialated and sluggish. JEFRY to R abdomen. Abd incision with sutures reddened. Pen estee drain to labia  PICC line to R upper arm.  Peripheral iv to L hand and R IJ  09:10am Critical care rounds with DR Daksha Infante and team  12 noon No change  updated per [facility]   14;15pm Family at bedside meeting with Lorena  17;45pm Report to Missy Quinn at hospice patient to transfer this pm

## 2023-02-20 NOTE — INTERDISCIPLINARY ROUNDS
participated in interdisciplinary rounds. Patient is intubated and no family are present. Hospice and palliative care to be consulted. Spiritual care to continue to provide support.

## 2023-02-20 NOTE — PROGRESS NOTES
[address] [place] [address] spoke to , Rashaad Gonzalez, he is in agreement for compassion wean at [address] [place] [address] and to meet with [address] [place] [address] at ICU between 1415 and 1430. Message left for Paz Russian ext [phone].

## 2023-02-20 NOTE — PROGRESS NOTES
Progress Note  Date:2023       Room:[place] [address]  Patient Name:Ewelina Jimenez     YOB: 1950     Age:72 y.o.    ROs: Review of system cannot be obtained due to patient being intubated    Subjective    Subjective   Review of Systems  Objective         Vitals Last 24 Hours:  TEMPERATURE:  Temp  Av.5 °F (35.8 °C)  Min: 95.9 °F (35.5 °C)  Max: 97.3 °F (36.3 °C)  RESPIRATIONS RANGE: Resp  Av.3  Min: 16  Max: 31  PULSE OXIMETRY RANGE: SpO2  Av.8 %  Min: 98 %  Max: 100 %  PULSE RANGE: Pulse  Av.5  Min: 63  Max: 86  BLOOD PRESSURE RANGE: Systolic (87FCI), LHN:820 , Min:83 , JZP:112   ; Diastolic (86RSU), URBANO:77, Min:45, Max:155    I/O (24Hr): Intake/Output Summary (Last 24 hours) at 2023 1247  Last data filed at 2023 1200  Gross per 24 hour   Intake 1237.49 ml   Output 3400 ml   Net -2162.51 ml     Objective:  General Appearance:  Ill-appearing. Vital signs: (most recent): Blood pressure (!) 140/61, pulse 86, temperature (!) 96.3 °F (35.7 °C), temperature source Rectal, resp. rate 28, height 5' 5\" (1.651 m), weight 215 lb 6.2 oz (97.7 kg), SpO2 99 %. HEENT: (+ ET tube)    Lungs: There are decreased breath sounds. Heart: S1 normal and S2 normal.    Abdomen: Abdomen is soft. Pulses: Distal pulses are intact. Neurological: (sedated). Skin:  Warm and dry.     Labs/Imaging/Diagnostics    Labs:  CBC:  Recent Labs     23  0330 23  0433 23  0600   WBC 36.4*  --  21.7*   RBC 2.83*  --  2.63*   HGB 7.9* 12.2 7.6*   HCT 27.4*  --  25.0*   MCV 97.0*  --  95.2*   RDW 19.4*  --  18.6*     --  314     CHEMISTRIES:  Recent Labs     23  1806 23  0000 23  0433 23  0600    145*  --  143   K 4.2 4.3  --  4.2    102  --  101   CO2 32* 33*  --  32*   BUN 29* 27*  --  26*   CREATININE 0.54 0.59 0.5* 0.55   GLUCOSE 142* 143*  --  139*   PHOS 4.0 3.5  --  3.1   MG 1.6* 1.6*  --  1.8     PT/INR:  Recent Labs     23  0343 02/19/23  0923   PROTIME 16.0* 15.4*   INR 1.3 1.2     APTT:  Recent Labs     02/19/23  0343 02/19/23 0923   APTT 47.1* 38.1*     LIVER PROFILE:  Recent Labs     02/19/23  0330 02/20/23  0600   AST 95* 27   ALT 64* 40*   BILIDIR  --  <0.2   BILITOT <0.2 0.3   ALKPHOS 243* 202*       Imaging Last 24 Hours:  CT HEAD WO CONTRAST    Result Date: 2/19/2023  EXAMINATION: CT OF THE HEAD WITHOUT CONTRAST  2/19/2023 4:38 am TECHNIQUE: CT of the head was performed without the administration of intravenous contrast. Automated exposure control, iterative reconstruction, and/or weight based adjustment of the mA/kV was utilized to reduce the radiation dose to as low as reasonably achievable. COMPARISON: None. HISTORY: ORDERING SYSTEM PROVIDED HISTORY: AMS TECHNOLOGIST PROVIDED HISTORY: Reason for exam:->AMS Has a \"code stroke\" or \"stroke alert\" been called? ->No Decision Support Exception - unselect if not a suspected or confirmed emergency medical condition->Emergency Medical Condition (MA) What reading provider will be dictating this exam?->CRC FINDINGS: BRAIN/VENTRICLES: Generalized atrophy identified of the brain with low-attenuation areas seen in the periventricular and subcortical white matter to suggest chronic small vessel ischemic change. There is no acute intracranial hemorrhage, mass effect or midline shift. No abnormal extra-axial fluid collection. The gray-white differentiation is maintained without evidence of an acute infarct. There is no evidence of hydrocephalus. ORBITS: The visualized portion of the orbits demonstrate no acute abnormality. SINUSES: The visualized paranasal sinuses demonstrate no acute abnormality. Fluid identified in the mastoid air cells bilaterally. There is mucosal thickening seen throughout the ethmoid air cells and sphenoid sinuses suggesting a chronic sinusitis. SOFT TISSUES/SKULL:  No acute abnormality of the visualized skull or soft tissues.      Atrophy and chronic changes seen within the brain with no acute intracranial abnormality. Fluid in the mastoid air cells bilaterally. Chronic sinusitis involving the ethmoid air cells and sphenoid sinuses bilaterally. CTA CHEST W WO CONTRAST    Result Date: 2/19/2023  EXAMINATION: CTA OF THE CHEST WITH AND WITHOUT CONTRAST 2/19/2023 4:38 am TECHNIQUE: CTA of the chest was performed before and after the administration of intravenous contrast.  Multiplanar reformatted images are provided for review. MIP images are provided for review. Automated exposure control, iterative reconstruction, and/or weight based adjustment of the mA/kV was utilized to reduce the radiation dose to as low as reasonably achievable. COMPARISON: None. HISTORY: ORDERING SYSTEM PROVIDED HISTORY: r/o PE, PNA TECHNOLOGIST PROVIDED HISTORY: Reason for exam:->r/o PE, PNA Decision Support Exception - unselect if not a suspected or confirmed emergency medical condition->Emergency Medical Condition (MA) What reading provider will be dictating this exam?->CRC FINDINGS: Pulmonary Arteries: Pulmonary arteries are adequately opacified for evaluation. No evidence of intraluminal filling defect to suggest pulmonary embolism. Main pulmonary artery is normal in caliber. Mediastinum: Tracheostomy tube is in place. There is a feeding tube identified within the esophagus. Coronary artery calcification is identified. No pericardial effusion. No evidence of mediastinal lymphadenopathy. The heart and pericardium demonstrate no acute abnormality. There is no acute abnormality of the thoracic aorta. Lungs/pleura: Moderate bilateral pleural effusions appearing to be loculated. Septal thickening and central interstitial prominence reflecting interstitial edema and superimposed infiltrate cannot be excluded. There is a nodular infiltrate measuring 2.5 cm in the right middle lobe in which continued follow-up is recommended to exclude possible malignancy in this area.  Upper Abdomen: Limited images of the upper abdomen are unremarkable. Soft Tissues/Bones: Nondisplaced left anterior 3rd through 6th rib fractures. Right anterior displaced 3rd rib fracture and nondisplaced 4th through 6th rib fractures. No evidence of pulmonary embolism. Moderate bilateral loculated pleural effusions. There is interseptal thickening and prominence suggesting interstitial edema with likely superimposed infiltrate at the lung bases. Question of a masslike density in the right middle lobe in which close interval follow-up is recommended to exclude a possible underlying lesion. Multiple anterior nondisplaced rib fracture seen bilaterally. RECOMMENDATIONS: Unavailable     CT ABDOMEN PELVIS W IV CONTRAST Additional Contrast? None    Result Date: 2/19/2023  EXAMINATION: CT OF THE ABDOMEN AND PELVIS WITH CONTRAST 2/19/2023 4:38 am TECHNIQUE: CT of the abdomen and pelvis was performed with the administration of intravenous contrast. Multiplanar reformatted images are provided for review. Automated exposure control, iterative reconstruction, and/or weight based adjustment of the mA/kV was utilized to reduce the radiation dose to as low as reasonably achievable. COMPARISON: None. HISTORY: ORDERING SYSTEM PROVIDED HISTORY: sepsis TECHNOLOGIST PROVIDED HISTORY: Reason for exam:->sepsis Additional Contrast?->None Decision Support Exception - unselect if not a suspected or confirmed emergency medical condition->Emergency Medical Condition (MA) What reading provider will be dictating this exam?->CRC FINDINGS: Lower Chest: See the report of the chest for full details. Organs: The liver is homogeneous in appearance with mild periportal edema. Possible sludge identified in the gallbladder with no discrete stones. No intrahepatic or extrahepatic biliary ductal dilatation. The pancreas is homogeneous. The spleen is unremarkable. Both adrenal glands are within normal limits. Symmetric enhancement of the renal parenchyma.   No stones or distension seen in the renal collecting system. GI/Bowel: The stomach is under distended. No wall thickening. Small bowel is within normal limits. No distension or mucosal abnormality. The colon is under distended. Wall thickening of the rectosigmoid region cannot be completely excluded. Rectal tube is in place. Pelvis: Latham catheter balloon within the bladder. Uterus is unremarkable. Peritoneum/Retroperitoneum: No abdominal retroperitoneal lymphadenopathy. Extensive atherosclerotic disease diffusely throughout the abdominal aorta and iliac vessels. Cannot exclude a moderate to high-grade stenosis of the distal abdominal aorta at the bifurcation. No pelvic adenopathy. Bones/Soft Tissues:  linear tubing or drainage catheter in the subcutaneous tissues along the left inguinal region within the pannus. Clinical correlation is needed. The bony structures are unremarkable. Anasarca seen within the soft tissues. Rectal tube in place. Decompression identified of the bowel predominately in the recto sigmoid region in which a mild colitis cannot be excluded. Clinical correlation is needed. Drainage catheter identified within the subcutaneous tissues in the left inguinal region. Clinical correlation is needed. Extensive atherosclerotic disease involving the abdominal aorta and iliac vessels. Moderate to high-grade stenosis cannot be excluded of the distal abdominal aorta at the bifurcation. XR CHEST PORTABLE    Result Date: 2/19/2023  EXAMINATION: ONE XRAY VIEW OF THE CHEST 2/19/2023 3:27 am COMPARISON: None. HISTORY: ORDERING SYSTEM PROVIDED HISTORY: post arrest TECHNOLOGIST PROVIDED HISTORY: Reason for exam:->post arrest What reading provider will be dictating this exam?->CRC FINDINGS: Cardiac borders obscured. Moderate bilateral perihilar and interstitial lung infiltrates suggesting pulmonary edema. Small bilateral pleural effusions may be present.   Minimally displaced fracture left lateral 7th rib.  No pneumothorax or subcutaneous emphysema. Left PICC tip well positioned at the cavoatrial junction. Tracheostomy in good position. Gastric feeding tube appears in good position. 1. Moderate perihilar pulmonary edema and small bilateral pleural effusions. 2. Minimally displaced fracture left lateral 7th rib. No pneumothorax or subcutaneous emphysema. 3. Tracheostomy, feeding tube, and left PICC appear with good position. RECOMMENDATION: Careful clinical correlation and follow up recommended. US ABDOMEN LIMITED Specify organ? GALLBLADDER    Result Date: 2/19/2023  EXAMINATION: RIGHT UPPER QUADRANT ULTRASOUND 2/19/2023 9:37 am COMPARISON: None. HISTORY: ORDERING SYSTEM PROVIDED HISTORY: r/o daryl TECHNOLOGIST PROVIDED HISTORY: Reason for exam:->r/o daryl Specify organ?->GALLBLADDER What reading provider will be dictating this exam?->CRC FINDINGS: LIVER:  The liver demonstrates normal echogenicity without evidence of intrahepatic biliary ductal dilatation. Liver is enlarged at 21.3 cm. Main portal vein measures 1.3 cm. BILIARY SYSTEM:  Gallbladder demonstrates no evidence of stones. There is no evidence of wall thickening or pericholecystic fluid. . Common bile duct is within normal limits measuring 6 mm. RIGHT KIDNEY: The right kidney is grossly unremarkable without evidence of hydronephrosis. Right kidney measures in length from pole to pole 10.8 cm. No solid cortical mass or renal cortical cyst seen within the right kidney. No hydronephrosis or nephrolithiasis. PANCREAS:  Pancreas is not well visualized. OTHER: No evidence of right upper quadrant ascites. Grossly unremarkable right upper quadrant ultrasound.  RECOMMENDATIONS: Unavailable     Assessment//Plan           Hospital Problems             Last Modified POA    * (Principal) Cardiopulmonary arrest ([address] 2/19/2023 Yes   Cardiopulmonary arrest  Acute on chronic respiratory failure  Lateral pneumonia  Loculated pleural effusion  shock  Assessment & Plan  C/w IV antibiotics, continue with vent bundle, poor prognosis. Family meeting with hospice today. Patient DNR CCA. Follow-up pulmonary and cardiology.   Electronically signed by Maliha Romeo MD on 2/20/23 at 12:47 PM EST

## 2023-02-20 NOTE — CARE COORDINATION
I messaged Hal and pt was at [facility] since 2/14. Ruslan Plata know the plan is likely hospice for [facility].

## 2023-02-22 LAB — L. PNEUMOPHILA SEROGP 1 UR AG: NEGATIVE

## 2023-02-23 LAB
CULTURE, RESPIRATORY: ABNORMAL
CULTURE, RESPIRATORY: ABNORMAL
ORGANISM: ABNORMAL

## 2023-02-24 LAB
BLOOD CULTURE, ROUTINE: NORMAL